# Patient Record
Sex: MALE | Race: WHITE | NOT HISPANIC OR LATINO | Employment: OTHER | URBAN - METROPOLITAN AREA
[De-identification: names, ages, dates, MRNs, and addresses within clinical notes are randomized per-mention and may not be internally consistent; named-entity substitution may affect disease eponyms.]

---

## 2023-10-04 ENCOUNTER — HOSPITAL ENCOUNTER (EMERGENCY)
Facility: HOSPITAL | Age: 73
Discharge: HOME | End: 2023-10-04
Attending: EMERGENCY MEDICINE
Payer: COMMERCIAL

## 2023-10-04 ENCOUNTER — APPOINTMENT (OUTPATIENT)
Dept: RADIOLOGY | Facility: HOSPITAL | Age: 73
End: 2023-10-04
Payer: COMMERCIAL

## 2023-10-04 VITALS
TEMPERATURE: 97.5 F | HEIGHT: 68 IN | SYSTOLIC BLOOD PRESSURE: 138 MMHG | WEIGHT: 164.9 LBS | DIASTOLIC BLOOD PRESSURE: 77 MMHG | BODY MASS INDEX: 24.99 KG/M2 | RESPIRATION RATE: 21 BRPM | HEART RATE: 68 BPM | OXYGEN SATURATION: 90 %

## 2023-10-04 DIAGNOSIS — R41.82 ALTERED MENTAL STATUS, UNSPECIFIED ALTERED MENTAL STATUS TYPE: Primary | ICD-10-CM

## 2023-10-04 LAB
ACANTHOCYTES BLD QL SMEAR: NORMAL
ALBUMIN SERPL BCP-MCNC: 3.9 G/DL (ref 3.4–5)
ALP SERPL-CCNC: 82 U/L (ref 33–136)
ALT SERPL W P-5'-P-CCNC: 6 U/L (ref 10–52)
AMMONIA PLAS-SCNC: 39 UMOL/L (ref 16–53)
ANION GAP SERPL CALC-SCNC: 8 MMOL/L (ref 10–20)
APPEARANCE UR: CLEAR
AST SERPL W P-5'-P-CCNC: 23 U/L (ref 9–39)
BASOPHILS # BLD AUTO: 0.01 X10*3/UL (ref 0–0.1)
BASOPHILS NFR BLD AUTO: 0.2 %
BILIRUB SERPL-MCNC: 0.4 MG/DL (ref 0–1.2)
BILIRUB UR STRIP.AUTO-MCNC: NEGATIVE MG/DL
BUN SERPL-MCNC: 10 MG/DL (ref 6–23)
BURR CELLS BLD QL SMEAR: NORMAL
CALCIUM SERPL-MCNC: 8.8 MG/DL (ref 8.6–10.3)
CHLORIDE SERPL-SCNC: 104 MMOL/L (ref 98–107)
CO2 SERPL-SCNC: 31 MMOL/L (ref 21–32)
COLOR UR: YELLOW
CREAT SERPL-MCNC: 0.82 MG/DL (ref 0.5–1.3)
EOSINOPHIL # BLD AUTO: 0.11 X10*3/UL (ref 0–0.4)
EOSINOPHIL NFR BLD AUTO: 2 %
ERYTHROCYTE [DISTWIDTH] IN BLOOD BY AUTOMATED COUNT: 21.1 % (ref 11.5–14.5)
GFR SERPL CREATININE-BSD FRML MDRD: >90 ML/MIN/1.73M*2
GLUCOSE SERPL-MCNC: 68 MG/DL (ref 74–99)
GLUCOSE UR STRIP.AUTO-MCNC: NEGATIVE MG/DL
HCT VFR BLD AUTO: 34 % (ref 41–52)
HGB BLD-MCNC: 10.3 G/DL (ref 13.5–17.5)
IMM GRANULOCYTES # BLD AUTO: 0.01 X10*3/UL (ref 0–0.5)
IMM GRANULOCYTES NFR BLD AUTO: 0.2 % (ref 0–0.9)
KETONES UR STRIP.AUTO-MCNC: NEGATIVE MG/DL
LEUKOCYTE ESTERASE UR QL STRIP.AUTO: NEGATIVE
LYMPHOCYTES # BLD AUTO: 0.91 X10*3/UL (ref 0.8–3)
LYMPHOCYTES NFR BLD AUTO: 16.6 %
MCH RBC QN AUTO: 24.3 PG (ref 26–34)
MCHC RBC AUTO-ENTMCNC: 30.3 G/DL (ref 32–36)
MCV RBC AUTO: 80 FL (ref 80–100)
MONOCYTES # BLD AUTO: 0.72 X10*3/UL (ref 0.05–0.8)
MONOCYTES NFR BLD AUTO: 13.1 %
NEUTROPHILS # BLD AUTO: 3.72 X10*3/UL (ref 1.6–5.5)
NEUTROPHILS NFR BLD AUTO: 67.9 %
NITRITE UR QL STRIP.AUTO: NEGATIVE
NRBC BLD-RTO: 0 /100 WBCS (ref 0–0)
PH UR STRIP.AUTO: 7 [PH]
PLATELET # BLD AUTO: 313 X10*3/UL (ref 150–450)
PMV BLD AUTO: 12 FL (ref 7.5–11.5)
POTASSIUM SERPL-SCNC: 3.5 MMOL/L (ref 3.5–5.3)
PROT SERPL-MCNC: 6.6 G/DL (ref 6.4–8.2)
PROT UR STRIP.AUTO-MCNC: NEGATIVE MG/DL
RBC # BLD AUTO: 4.24 X10*6/UL (ref 4.5–5.9)
RBC # UR STRIP.AUTO: NEGATIVE /UL
RBC MORPH BLD: NORMAL
SODIUM SERPL-SCNC: 139 MMOL/L (ref 136–145)
SP GR UR STRIP.AUTO: 1.02
TARGETS BLD QL SMEAR: NORMAL
UROBILINOGEN UR STRIP.AUTO-MCNC: NORMAL MG/DL
WBC # BLD AUTO: 5.5 X10*3/UL (ref 4.4–11.3)

## 2023-10-04 PROCEDURE — G1004 CDSM NDSC: HCPCS

## 2023-10-04 PROCEDURE — 82140 ASSAY OF AMMONIA: CPT

## 2023-10-04 PROCEDURE — 99285 EMERGENCY DEPT VISIT HI MDM: CPT | Mod: 25

## 2023-10-04 PROCEDURE — 80053 COMPREHEN METABOLIC PANEL: CPT

## 2023-10-04 PROCEDURE — 99284 EMERGENCY DEPT VISIT MOD MDM: CPT | Performed by: EMERGENCY MEDICINE

## 2023-10-04 PROCEDURE — 85025 COMPLETE CBC W/AUTO DIFF WBC: CPT

## 2023-10-04 PROCEDURE — 81003 URINALYSIS AUTO W/O SCOPE: CPT

## 2023-10-04 PROCEDURE — 36415 COLL VENOUS BLD VENIPUNCTURE: CPT

## 2023-10-04 PROCEDURE — 70450 CT HEAD/BRAIN W/O DYE: CPT | Performed by: RADIOLOGY

## 2023-10-04 PROCEDURE — 99285 EMERGENCY DEPT VISIT HI MDM: CPT | Performed by: EMERGENCY MEDICINE

## 2023-10-04 ASSESSMENT — PAIN SCALES - GENERAL
PAINLEVEL_OUTOF10: 4
PAINLEVEL_OUTOF10: 0 - NO PAIN

## 2023-10-04 ASSESSMENT — COLUMBIA-SUICIDE SEVERITY RATING SCALE - C-SSRS
2. HAVE YOU ACTUALLY HAD ANY THOUGHTS OF KILLING YOURSELF?: NO
6. HAVE YOU EVER DONE ANYTHING, STARTED TO DO ANYTHING, OR PREPARED TO DO ANYTHING TO END YOUR LIFE?: NO
1. IN THE PAST MONTH, HAVE YOU WISHED YOU WERE DEAD OR WISHED YOU COULD GO TO SLEEP AND NOT WAKE UP?: NO

## 2023-10-04 ASSESSMENT — LIFESTYLE VARIABLES
EVER FELT BAD OR GUILTY ABOUT YOUR DRINKING: NO
EVER HAD A DRINK FIRST THING IN THE MORNING TO STEADY YOUR NERVES TO GET RID OF A HANGOVER: NO
HAVE PEOPLE ANNOYED YOU BY CRITICIZING YOUR DRINKING: NO
HAVE YOU EVER FELT YOU SHOULD CUT DOWN ON YOUR DRINKING: NO

## 2023-10-04 ASSESSMENT — PAIN DESCRIPTION - LOCATION: LOCATION: SHOULDER

## 2023-10-04 ASSESSMENT — PAIN DESCRIPTION - ORIENTATION: ORIENTATION: LEFT

## 2023-10-04 ASSESSMENT — PAIN - FUNCTIONAL ASSESSMENT: PAIN_FUNCTIONAL_ASSESSMENT: 0-10

## 2023-10-04 NOTE — ED PROVIDER NOTES
HPI   No chief complaint on file.      73-year-old male brought in via EMS from his assisted living and memory care facility with a chief complaint of altered mental status.  Upon initial evaluation the patient is alert and oriented x3.  He is overall not complaining of any problems.  Per EMS report there was concern that he has not been taking his medications and has been having difficulty swallowing recently.  Patient states he does not know where they [place] from and does not report any difficulty with swallowing recently.  Otherwise he is complaining of left leg pain but denies any other symptoms.  Denies chest pain, shortness of breath, lightheadedness, dizziness, headache, abdominal pain, nausea, vomiting, diarrhea, constipation, dysuria, hematuria, hematochezia, melena.    A complete 10 point review of systems was completed and is otherwise negative except as listed in the HPI.      History provided by:  EMS personnel   used: No                          No data recorded                Patient History   Past Medical History:   Diagnosis Date    Personal history of other diseases of the circulatory system     History of hypertension    Personal history of transient ischemic attack (TIA), and cerebral infarction without residual deficits     History of stroke     Past Surgical History:   Procedure Laterality Date    OTHER SURGICAL HISTORY  05/26/2022    Knee replacement     No family history on file.  Social History     Tobacco Use    Smoking status: Not on file    Smokeless tobacco: Not on file   Substance Use Topics    Alcohol use: Not on file    Drug use: Not on file       Physical Exam   ED Triage Vitals   Temp Pulse Resp BP   -- -- -- --      SpO2 Temp src Heart Rate Source Patient Position   -- -- -- --      BP Location FiO2 (%)     -- --       Physical Exam  Vitals and nursing note reviewed.   Constitutional:       General: He is not in acute distress.     Appearance: Normal  appearance. He is not ill-appearing or toxic-appearing.   HENT:      Head: Normocephalic and atraumatic.      Right Ear: External ear normal.      Left Ear: External ear normal.      Nose: Nose normal.   Eyes:      General:         Right eye: No discharge.         Left eye: No discharge.      Extraocular Movements: Extraocular movements intact.      Conjunctiva/sclera: Conjunctivae normal.      Pupils: Pupils are equal, round, and reactive to light.   Cardiovascular:      Rate and Rhythm: Normal rate and regular rhythm.      Pulses: Normal pulses.      Heart sounds: Normal heart sounds. No murmur heard.  Pulmonary:      Effort: Pulmonary effort is normal.      Breath sounds: Normal breath sounds.   Abdominal:      General: There is no distension.      Palpations: Abdomen is soft. There is no mass.      Tenderness: There is no abdominal tenderness. There is no guarding.   Musculoskeletal:         General: No deformity or signs of injury. Normal range of motion.   Skin:     General: Skin is warm and dry.   Neurological:      General: No focal deficit present.      Mental Status: He is alert and oriented to person, place, and time. Mental status is at baseline.   Psychiatric:         Mood and Affect: Mood normal.         Behavior: Behavior normal.         ED Course & MDM   Diagnoses as of 10/04/23 1356   Altered mental status, unspecified altered mental status type       Medical Decision Making  73-year-old male brought in via EMS from his nursing home with concern for altered mental status.  Per EMS report patient also was reportedly having difficulty with swallowing over the past 3 weeks.  Patient denies any issues with swallowing.  Patient comfort CODE STATUS is DNR comfort care.  Patient was discussed with the nursing home, they are concerned that he is having dysphagia and they have attempted to obtain labs, were unable to obtain ammonia and a urinalysis.  We will obtain basic labs CBC, CMP, UA, ammonia as well as  a CT of the head.    Work-up is overall negative.  CT head without any acute injuries.  Lab work is unremarkable.  UA normal.  Ammonia normal.  CBC and CMP unremarkable.  No need for further evaluation or treatment in the hospital at this time.  Patient is DNR comfort care and there are no acute injuries or findings on his work-up that would require further treatment or evaluation.  Patient be discharged back home to his nursing home.  Patient will be provided transport.    Riky Barton DO, PGY-2  Emergency Medicine    Plan of care discussed with the attending physician.    Amount and/or Complexity of Data Reviewed  Labs: ordered.  Radiology: ordered.  ECG/medicine tests: ordered.    Risk  Decision regarding hospitalization.        Procedure  Procedures     Riky Barton DO  Resident  10/04/23 3557

## 2023-10-05 ENCOUNTER — HOSPITAL ENCOUNTER (OUTPATIENT)
Dept: CARDIOLOGY | Facility: HOSPITAL | Age: 73
Discharge: HOME | End: 2023-10-05
Payer: COMMERCIAL

## 2023-10-05 LAB
ATRIAL RATE: 64 BPM
P AXIS: 45 DEGREES
P OFFSET: 178 MS
P ONSET: 116 MS
PR INTERVAL: 212 MS
Q ONSET: 222 MS
QRS COUNT: 11 BEATS
QRS DURATION: 90 MS
QT INTERVAL: 412 MS
QTC CALCULATION(BAZETT): 425 MS
QTC FREDERICIA: 420 MS
R AXIS: 42 DEGREES
T AXIS: 51 DEGREES
T OFFSET: 428 MS
VENTRICULAR RATE: 64 BPM

## 2023-10-05 PROCEDURE — 93005 ELECTROCARDIOGRAM TRACING: CPT

## 2023-10-05 PROCEDURE — 93010 ELECTROCARDIOGRAM REPORT: CPT | Performed by: INTERNAL MEDICINE

## 2024-04-12 ENCOUNTER — LAB REQUISITION (OUTPATIENT)
Dept: LAB | Facility: HOSPITAL | Age: 74
End: 2024-04-12
Payer: COMMERCIAL

## 2024-04-12 DIAGNOSIS — D64.9 ANEMIA, UNSPECIFIED: ICD-10-CM

## 2024-04-12 LAB
ANION GAP SERPL CALC-SCNC: 12 MMOL/L (ref 10–20)
BUN SERPL-MCNC: 10 MG/DL (ref 6–23)
CALCIUM SERPL-MCNC: 8.2 MG/DL (ref 8.6–10.3)
CHLORIDE SERPL-SCNC: 102 MMOL/L (ref 98–107)
CO2 SERPL-SCNC: 27 MMOL/L (ref 21–32)
CREAT SERPL-MCNC: 0.68 MG/DL (ref 0.5–1.3)
EGFRCR SERPLBLD CKD-EPI 2021: >90 ML/MIN/1.73M*2
ERYTHROCYTE [DISTWIDTH] IN BLOOD BY AUTOMATED COUNT: 21.3 % (ref 11.5–14.5)
GLUCOSE SERPL-MCNC: 96 MG/DL (ref 74–99)
HCT VFR BLD AUTO: 38.8 % (ref 41–52)
HGB BLD-MCNC: 11.6 G/DL (ref 13.5–17.5)
MCH RBC QN AUTO: 26.2 PG (ref 26–34)
MCHC RBC AUTO-ENTMCNC: 29.9 G/DL (ref 32–36)
MCV RBC AUTO: 88 FL (ref 80–100)
NRBC BLD-RTO: 0 /100 WBCS (ref 0–0)
PLATELET # BLD AUTO: 363 X10*3/UL (ref 150–450)
POTASSIUM SERPL-SCNC: 4 MMOL/L (ref 3.5–5.3)
RBC # BLD AUTO: 4.43 X10*6/UL (ref 4.5–5.9)
SODIUM SERPL-SCNC: 137 MMOL/L (ref 136–145)
WBC # BLD AUTO: 5.2 X10*3/UL (ref 4.4–11.3)

## 2024-04-12 PROCEDURE — 85027 COMPLETE CBC AUTOMATED: CPT

## 2024-04-12 PROCEDURE — 80048 BASIC METABOLIC PNL TOTAL CA: CPT

## 2024-05-28 ENCOUNTER — OFFICE VISIT (OUTPATIENT)
Dept: ORTHOPEDIC SURGERY | Facility: CLINIC | Age: 74
End: 2024-05-28
Payer: COMMERCIAL

## 2024-05-28 ENCOUNTER — HOSPITAL ENCOUNTER (OUTPATIENT)
Dept: RADIOLOGY | Facility: CLINIC | Age: 74
Discharge: HOME | End: 2024-05-28
Payer: COMMERCIAL

## 2024-05-28 DIAGNOSIS — M79.642 PAIN OF LEFT HAND: ICD-10-CM

## 2024-05-28 DIAGNOSIS — S62.337A DISPLACED FRACTURE OF NECK OF FIFTH METACARPAL BONE, LEFT HAND, INITIAL ENCOUNTER FOR CLOSED FRACTURE: ICD-10-CM

## 2024-05-28 PROCEDURE — 73130 X-RAY EXAM OF HAND: CPT | Mod: LT

## 2024-05-28 PROCEDURE — 26600 TREAT METACARPAL FRACTURE: CPT | Performed by: FAMILY MEDICINE

## 2024-05-28 PROCEDURE — 73130 X-RAY EXAM OF HAND: CPT | Mod: LEFT SIDE | Performed by: FAMILY MEDICINE

## 2024-05-28 PROCEDURE — 29075 APPL CST ELBW FNGR SHORT ARM: CPT | Performed by: FAMILY MEDICINE

## 2024-05-28 PROCEDURE — 99204 OFFICE O/P NEW MOD 45 MIN: CPT | Performed by: FAMILY MEDICINE

## 2024-05-28 PROCEDURE — 99214 OFFICE O/P EST MOD 30 MIN: CPT | Mod: 57 | Performed by: FAMILY MEDICINE

## 2024-05-28 PROCEDURE — 26605 TREAT METACARPAL FRACTURE: CPT | Performed by: FAMILY MEDICINE

## 2024-05-28 RX ORDER — HYDROCODONE BITARTRATE AND ACETAMINOPHEN 5; 325 MG/1; MG/1
1 TABLET ORAL EVERY 12 HOURS PRN
Qty: 14 TABLET | Refills: 0 | Status: SHIPPED | OUTPATIENT
Start: 2024-05-28 | End: 2024-06-04

## 2024-05-28 NOTE — PROGRESS NOTES
Acute Injury New Patient Visit    CC:   Chief Complaint   Patient presents with    Left Hand - Pain       HPI: Nguyễn is a 73 y.o.male who presents today with new complaints of left hand pain and discomfort.  Patient currently resides in a skilled nursing facility with multiple chronic comorbidities states that he had fallen and injured his left hand the other day.  X-rays were obtained at that facility that demonstrated a small fracture to the fifth metacarpal.  He presents here today for further evaluation.  He denies any numbness tingling or burning.        Review of Systems   GENERAL: Negative for malaise, significant weight loss, fever  MUSCULOSKELETAL: See HPI  NEURO: Negative for numbness / tingling     Past Medical History  Past Medical History:   Diagnosis Date    Personal history of other diseases of the circulatory system     History of hypertension    Personal history of transient ischemic attack (TIA), and cerebral infarction without residual deficits     History of stroke       Medication review  Medication Documentation Review Audit       Reviewed by Riky Batron DO (Resident) on 10/04/23 at 1336      Medication Order Taking? Sig Documenting Provider Last Dose Status            No Medications to Display                                   Allergies  No Known Allergies    Social History  Social History     Socioeconomic History    Marital status: Single     Spouse name: Not on file    Number of children: Not on file    Years of education: Not on file    Highest education level: Not on file   Occupational History    Not on file   Tobacco Use    Smoking status: Never    Smokeless tobacco: Never   Vaping Use    Vaping status: Never Used   Substance and Sexual Activity    Alcohol use: Never    Drug use: Not on file    Sexual activity: Not on file   Other Topics Concern    Not on file   Social History Narrative    Not on file     Social Determinants of Health     Financial Resource Strain: Not on file    Food Insecurity: Not on file   Transportation Needs: Not on file   Physical Activity: Not on file   Stress: Not on file   Social Connections: Not on file   Intimate Partner Violence: Not on file   Housing Stability: Not on file       Surgical History  Past Surgical History:   Procedure Laterality Date    OTHER SURGICAL HISTORY  05/26/2022    Knee replacement       Physical Exam:  GENERAL:  Patient is awake, alert, and oriented to person place and time.  Patient appears well nourished and well kept.  Affect Calm, Not Acutely Distressed.  HEENT:  Normocephalic, Atraumatic, EOMI  CARDIOVASCULAR:  Hemodynamically stable.  RESPIRATORY:  Normal respirations with unlabored breathing.  NEURO: Gross sensation intact to the upper extremities bilaterally.  Extremity: Left hand exam demonstrates significant soft tissue swelling and bruising he has tenderness palpation with mild crepitus at the fifth metacarpal head neck region.  He has a full ability to extend with no presence of any extensor lag.  There is no obvious rotational deficiency or deformity.  Good distal cap refill noted throughout.  The remainder of the left upper extremity demonstrates good range of motion and strength.  There is no obvious visible open cuts wounds or sores.      Diagnostics: X-rays today demonstrate slightly angulated apex dorsal and minimally comminuted fifth metacarpal head neck fracture        Procedure: Short arm cast application with molding buttress  Procedures    Assessment:   Problem List Items Addressed This Visit    None  Visit Diagnoses       Pain of left hand        Relevant Orders    XR hand left 3+ views    Displaced fracture of neck of fifth metacarpal bone, left hand, initial encounter for closed fracture        Relevant Medications    HYDROcodone-acetaminophen (Norco) 5-325 mg tablet             Plan: We discussed the nonoperative nature of this injury with the patient at the bedside.  He is agreeable and willing to proceed  forward.  He was provided with a short arm cast here today with a slight extension mold and buttress to the fifth metacarpal neck fracture.  We will see him back in 3 to 4 weeks for repeat evaluation we will repeat x-rays out of the cast 3 views of the left hand we will likely transition to a removable fracture brace at that time.  Will provide a printed off prescription for pain medication that he may take to his facility.  He will continue to comply with the vitamin D that is already supplemented 5000 units a day.  He will maintain nonweightbearing to left upper extremity to the best that he can until seen in follow-up.  Orders Placed This Encounter    XR hand left 3+ views    HYDROcodone-acetaminophen (Norco) 5-325 mg tablet      At the conclusion of the visit there were no further questions by the patient/family regarding their plan of care.  Patient was instructed to call or return with any issues, questions, or concerns regarding their injury and/or treatment plan described above.     05/28/24 at 3:15 PM - Cole C Budinsky, MD    Office: [phone]    This note was prepared using voice recognition software.  The details of this note are correct and have been reviewed, and corrected to the best of my ability.  Some grammatical errors may persist related to the Dragon software.

## 2024-06-26 ENCOUNTER — APPOINTMENT (OUTPATIENT)
Dept: ORTHOPEDIC SURGERY | Facility: CLINIC | Age: 74
End: 2024-06-26
Payer: COMMERCIAL

## 2024-06-28 ENCOUNTER — HOSPITAL ENCOUNTER (OUTPATIENT)
Dept: RADIOLOGY | Facility: CLINIC | Age: 74
Discharge: HOME | End: 2024-06-28
Payer: COMMERCIAL

## 2024-06-28 ENCOUNTER — OFFICE VISIT (OUTPATIENT)
Dept: ORTHOPEDIC SURGERY | Facility: CLINIC | Age: 74
End: 2024-06-28
Payer: COMMERCIAL

## 2024-06-28 DIAGNOSIS — S62.337A DISPLACED FRACTURE OF NECK OF FIFTH METACARPAL BONE, LEFT HAND, INITIAL ENCOUNTER FOR CLOSED FRACTURE: ICD-10-CM

## 2024-06-28 PROCEDURE — 1160F RVW MEDS BY RX/DR IN RCRD: CPT | Performed by: FAMILY MEDICINE

## 2024-06-28 PROCEDURE — 99024 POSTOP FOLLOW-UP VISIT: CPT | Performed by: FAMILY MEDICINE

## 2024-06-28 PROCEDURE — 1036F TOBACCO NON-USER: CPT | Performed by: FAMILY MEDICINE

## 2024-06-28 PROCEDURE — 1159F MED LIST DOCD IN RCRD: CPT | Performed by: FAMILY MEDICINE

## 2024-06-28 PROCEDURE — L3809 WHFO W/O JOINTS PRE OTS: HCPCS | Performed by: FAMILY MEDICINE

## 2024-06-28 PROCEDURE — 73130 X-RAY EXAM OF HAND: CPT | Mod: LT

## 2024-06-28 NOTE — PROGRESS NOTES
Established Patient Follow-Up Visit    CC:   Chief Complaint   Patient presents with    Left Hand - Follow-up, Pain     Displaced fracture of neck of fifth metacarpal bone, left hand, initial encounter for closed fracture      Xrays today   XOP       HPI:  Nguyễn is a 73 y.o. male returns here today for follow-up visit regarding: Follow-up of his left fifth metacarpal neck fracture.  He tolerated the cast well.  He presents here today for repeat evaluation denies any worsening issues or concerns          REVIEW OF SYSTEMS:  GENERAL: Negative for malaise, significant weight loss, fever  MUSCULOSKELETAL: See HPI  NEURO: Negative for numbness / tingling       PHYSICAL EXAM:  -Neuro: Gross sensation intact to the upper extremities bilaterally.  -Extremity: Left hand demonstrates soft tissue swelling he has got some mild tenderness over the fifth metacarpal neck with no crepitus.  He has ability to fully extend the fifth MCP and flex he can generate a full grasp and .  No obvious angulation or rotational deformity seen throughout the digits.  Pulses and sensation are intact no open cuts wounds or sores.    IMAGING: Repeat x-rays today demonstrate stable appearing minimally angulated left fifth metacarpal neck fracture.  XR hand left 3+ views  Narrative: Interpreted By:  Budinsky, Cole,   STUDY:  XR HAND LEFT 3+ VIEWS; ;  5/28/2024 2:54 pm      INDICATION:  Signs/Symptoms:pain.      ACCESSION NUMBER(S):  [id number]      ORDERING CLINICIAN:  COLE BUDINSKY      Impression: Three views left hand demonstrate an acute minimally comminuted and  minimally angulated apex dorsal left 5th metacarpal neck fracture.  Shortening of the fracture is present. 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          Signed by: Cole Budinsky 5/29/2024 5:55 PM  Dictation workstation:    [id number]      PROCEDURE: None  Procedures     ASSESSMENT:   Follow-up visit for:  Problem List Items Addressed This Visit    None  Visit Diagnoses       Displaced fracture of neck of fifth metacarpal bone, left hand, initial encounter for closed fracture        Relevant Orders    XR hand left 3+ views    Boxer Fracture Brace             PLAN: At this time we will transition patient into a ulnar gutter wrist free removable fracture brace.  He can come out of this for early range of motion and strength recovery as tolerated.  Recommend he continue with the brace for protection and immobilization when up and ambulatory.  Will see him back in 3 weeks for repeat evaluation repeat final set x-rays 3 views of the left hand at that time  Orders Placed This Encounter    Boxer Fracture Brace    XR hand left 3+ views           At the conclusion of the visit there were no further questions by the patient/family regarding their plan of care.  Patient was instructed to call or return with any issues, questions, or concerns regarding their injury and/or treatment plan described above.     06/28/24 at 5:10 PM - Cole C Budinsky, MD    Office: [phone]    This note was prepared using voice recognition software.  The details of this note are correct and have been reviewed, and corrected to the best of my ability.  Some grammatical errors may persist related to the Dragon software.

## 2024-07-24 ENCOUNTER — HOSPITAL ENCOUNTER (OUTPATIENT)
Dept: RADIOLOGY | Facility: CLINIC | Age: 74
Discharge: HOME | End: 2024-07-24
Payer: COMMERCIAL

## 2024-07-24 ENCOUNTER — OFFICE VISIT (OUTPATIENT)
Dept: ORTHOPEDIC SURGERY | Facility: CLINIC | Age: 74
End: 2024-07-24
Payer: COMMERCIAL

## 2024-07-24 DIAGNOSIS — S62.337A DISPLACED FRACTURE OF NECK OF FIFTH METACARPAL BONE, LEFT HAND, INITIAL ENCOUNTER FOR CLOSED FRACTURE: ICD-10-CM

## 2024-07-24 PROCEDURE — 99211 OFF/OP EST MAY X REQ PHY/QHP: CPT | Performed by: FAMILY MEDICINE

## 2024-07-24 PROCEDURE — 73130 X-RAY EXAM OF HAND: CPT | Mod: LT

## 2024-07-24 PROCEDURE — 1036F TOBACCO NON-USER: CPT | Performed by: FAMILY MEDICINE

## 2024-07-24 PROCEDURE — 99024 POSTOP FOLLOW-UP VISIT: CPT | Performed by: FAMILY MEDICINE

## 2024-07-24 PROCEDURE — 1160F RVW MEDS BY RX/DR IN RCRD: CPT | Performed by: FAMILY MEDICINE

## 2024-07-24 PROCEDURE — 1159F MED LIST DOCD IN RCRD: CPT | Performed by: FAMILY MEDICINE

## 2024-07-24 PROCEDURE — 73130 X-RAY EXAM OF HAND: CPT | Mod: LEFT SIDE | Performed by: FAMILY MEDICINE

## 2024-07-24 NOTE — PROGRESS NOTES
Established Patient Follow-Up Visit    CC:   Chief Complaint   Patient presents with    Left Hand - Follow-up, Pain     Displaced fracture of neck of fifth metacarpal bone, left hand, initial encounter for closed fracture      Xrays today        HPI:  Nguyễn is a 73 y.o. male returns here today for follow-up visit regarding: Follow-up of his left fifth metacarpal neck fracture.  He states has been compliant with the brace.  Denies any additional issues needs or concerns at present.  Has a little bit of discomfort directly pressing on the fracture but otherwise has significant improved range of motion and strength.          REVIEW OF SYSTEMS:  GENERAL: Negative for malaise, significant weight loss, fever  MUSCULOSKELETAL: See HPI  NEURO: Negative for numbness / tingling       PHYSICAL EXAM:  -Neuro: Gross sensation intact to the upper extremities bilaterally.  -Extremity: Left hand demonstrates minimal soft tissue swelling there is a prominence of the fifth metacarpal head neck region he has no extensor lag.  He can fully extend and flex the digit has full MCP DIP and PIP motion.  Distal cap refill is intact.  No pain elsewhere.    IMAGING: Repeat x-rays today demonstrate stable appearing minimally displaced comminuted and shortened left fifth metacarpal head neck fracture.  There is stable appearing alignment and subtle increase sclerotic calcification indicating continued healing.      PROCEDURE: None  Procedures     ASSESSMENT:   Follow-up visit for:  Problem List Items Addressed This Visit    None  Visit Diagnoses       Displaced fracture of neck of fifth metacarpal bone, left hand, initial encounter for closed fracture        Relevant Orders    XR hand left 3+ views             PLAN: At this time discussed with patient that the fracture is trending towards healing and looking very well on x-rays today.  We will have the patient follow-up in 6 weeks for repeat evaluation.  Repeat 1 final set x-rays 3 views of  the left hand.  He will continue with his vitamin D supplementation.  He can start to wean from his removable fracture brace as tolerated.  Would recommend when up and ambulating that he has the brace on for protection otherwise he may take this off when seated or in a nonrisky situation.  He does not need to sleep at this when seated eating meals or relaxing he does not need to have the brace on as well.  He can start to work increased range of motion and strength with physical and Occupational Therapy as prescribed at his facility.  They should call or return with any worsening issues otherwise we will anticipate 1 final set of x-rays and repeat evaluation in 6 weeks.  Orders Placed This Encounter    XR hand left 3+ views           At the conclusion of the visit there were no further questions by the patient/family regarding their plan of care.  Patient was instructed to call or return with any issues, questions, or concerns regarding their injury and/or treatment plan described above.     07/24/24 at 3:05 PM - Cole C Budinsky, MD    Office: [phone]    This note was prepared using voice recognition software.  The details of this note are correct and have been reviewed, and corrected to the best of my ability.  Some grammatical errors may persist related to the Dragon software.

## 2024-09-02 ENCOUNTER — HOSPITAL ENCOUNTER (EMERGENCY)
Facility: HOSPITAL | Age: 74
Discharge: PSYCHIATRIC OR SUBSTANCE USE FACILITY | End: 2024-09-03
Attending: STUDENT IN AN ORGANIZED HEALTH CARE EDUCATION/TRAINING PROGRAM
Payer: COMMERCIAL

## 2024-09-02 ENCOUNTER — APPOINTMENT (OUTPATIENT)
Dept: CARDIOLOGY | Facility: HOSPITAL | Age: 74
End: 2024-09-02
Payer: COMMERCIAL

## 2024-09-02 DIAGNOSIS — R62.7 FAILURE TO THRIVE IN ADULT: Primary | ICD-10-CM

## 2024-09-02 DIAGNOSIS — R45.851 SUICIDAL IDEATION: ICD-10-CM

## 2024-09-02 LAB
ALBUMIN SERPL BCP-MCNC: 4 G/DL (ref 3.4–5)
ALP SERPL-CCNC: 119 U/L (ref 33–136)
ALT SERPL W P-5'-P-CCNC: 18 U/L (ref 10–52)
AMPHETAMINES UR QL SCN: NORMAL
ANION GAP SERPL CALC-SCNC: 12 MMOL/L (ref 10–20)
APAP SERPL-MCNC: <10 UG/ML
APPEARANCE UR: CLEAR
AST SERPL W P-5'-P-CCNC: 25 U/L (ref 9–39)
BARBITURATES UR QL SCN: NORMAL
BASOPHILS # BLD AUTO: 0.01 X10*3/UL (ref 0–0.1)
BASOPHILS NFR BLD AUTO: 0.2 %
BENZODIAZ UR QL SCN: NORMAL
BILIRUB SERPL-MCNC: 0.3 MG/DL (ref 0–1.2)
BILIRUB UR STRIP.AUTO-MCNC: NEGATIVE MG/DL
BUN SERPL-MCNC: 9 MG/DL (ref 6–23)
BZE UR QL SCN: NORMAL
CALCIUM SERPL-MCNC: 8.8 MG/DL (ref 8.6–10.3)
CANNABINOIDS UR QL SCN: NORMAL
CHLORIDE SERPL-SCNC: 101 MMOL/L (ref 98–107)
CO2 SERPL-SCNC: 27 MMOL/L (ref 21–32)
COLOR UR: COLORLESS
CREAT SERPL-MCNC: 0.62 MG/DL (ref 0.5–1.3)
EGFRCR SERPLBLD CKD-EPI 2021: >90 ML/MIN/1.73M*2
EOSINOPHIL # BLD AUTO: 0.09 X10*3/UL (ref 0–0.4)
EOSINOPHIL NFR BLD AUTO: 1.4 %
ERYTHROCYTE [DISTWIDTH] IN BLOOD BY AUTOMATED COUNT: 18.7 % (ref 11.5–14.5)
ETHANOL SERPL-MCNC: <10 MG/DL
FENTANYL+NORFENTANYL UR QL SCN: NORMAL
GLUCOSE SERPL-MCNC: 105 MG/DL (ref 74–99)
GLUCOSE UR STRIP.AUTO-MCNC: NORMAL MG/DL
HCT VFR BLD AUTO: 41.3 % (ref 41–52)
HGB BLD-MCNC: 13.1 G/DL (ref 13.5–17.5)
IMM GRANULOCYTES # BLD AUTO: 0.02 X10*3/UL (ref 0–0.5)
IMM GRANULOCYTES NFR BLD AUTO: 0.3 % (ref 0–0.9)
KETONES UR STRIP.AUTO-MCNC: NEGATIVE MG/DL
LEUKOCYTE ESTERASE UR QL STRIP.AUTO: NEGATIVE
LYMPHOCYTES # BLD AUTO: 1.39 X10*3/UL (ref 0.8–3)
LYMPHOCYTES NFR BLD AUTO: 21 %
MCH RBC QN AUTO: 29.2 PG (ref 26–34)
MCHC RBC AUTO-ENTMCNC: 31.7 G/DL (ref 32–36)
MCV RBC AUTO: 92 FL (ref 80–100)
METHADONE UR QL SCN: NORMAL
MONOCYTES # BLD AUTO: 0.81 X10*3/UL (ref 0.05–0.8)
MONOCYTES NFR BLD AUTO: 12.2 %
NEUTROPHILS # BLD AUTO: 4.3 X10*3/UL (ref 1.6–5.5)
NEUTROPHILS NFR BLD AUTO: 64.9 %
NITRITE UR QL STRIP.AUTO: NEGATIVE
NRBC BLD-RTO: 0 /100 WBCS (ref 0–0)
OPIATES UR QL SCN: NORMAL
OXYCODONE+OXYMORPHONE UR QL SCN: NORMAL
PCP UR QL SCN: NORMAL
PH UR STRIP.AUTO: 6.5 [PH]
PLATELET # BLD AUTO: 335 X10*3/UL (ref 150–450)
POTASSIUM SERPL-SCNC: 4 MMOL/L (ref 3.5–5.3)
PROT SERPL-MCNC: 7.2 G/DL (ref 6.4–8.2)
PROT UR STRIP.AUTO-MCNC: NEGATIVE MG/DL
RBC # BLD AUTO: 4.48 X10*6/UL (ref 4.5–5.9)
RBC # UR STRIP.AUTO: NEGATIVE /UL
SALICYLATES SERPL-MCNC: <3 MG/DL
SODIUM SERPL-SCNC: 136 MMOL/L (ref 136–145)
SP GR UR STRIP.AUTO: 1.01
UROBILINOGEN UR STRIP.AUTO-MCNC: NORMAL MG/DL
WBC # BLD AUTO: 6.6 X10*3/UL (ref 4.4–11.3)

## 2024-09-02 PROCEDURE — 2500000001 HC RX 250 WO HCPCS SELF ADMINISTERED DRUGS (ALT 637 FOR MEDICARE OP): Performed by: STUDENT IN AN ORGANIZED HEALTH CARE EDUCATION/TRAINING PROGRAM

## 2024-09-02 PROCEDURE — 81003 URINALYSIS AUTO W/O SCOPE: CPT | Performed by: STUDENT IN AN ORGANIZED HEALTH CARE EDUCATION/TRAINING PROGRAM

## 2024-09-02 PROCEDURE — 93010 ELECTROCARDIOGRAM REPORT: CPT | Performed by: STUDENT IN AN ORGANIZED HEALTH CARE EDUCATION/TRAINING PROGRAM

## 2024-09-02 PROCEDURE — 2500000001 HC RX 250 WO HCPCS SELF ADMINISTERED DRUGS (ALT 637 FOR MEDICARE OP)

## 2024-09-02 PROCEDURE — 80143 DRUG ASSAY ACETAMINOPHEN: CPT | Performed by: STUDENT IN AN ORGANIZED HEALTH CARE EDUCATION/TRAINING PROGRAM

## 2024-09-02 PROCEDURE — 93005 ELECTROCARDIOGRAM TRACING: CPT

## 2024-09-02 PROCEDURE — 99285 EMERGENCY DEPT VISIT HI MDM: CPT | Performed by: STUDENT IN AN ORGANIZED HEALTH CARE EDUCATION/TRAINING PROGRAM

## 2024-09-02 PROCEDURE — 99285 EMERGENCY DEPT VISIT HI MDM: CPT

## 2024-09-02 PROCEDURE — 80307 DRUG TEST PRSMV CHEM ANLYZR: CPT | Performed by: STUDENT IN AN ORGANIZED HEALTH CARE EDUCATION/TRAINING PROGRAM

## 2024-09-02 PROCEDURE — 80053 COMPREHEN METABOLIC PANEL: CPT | Performed by: STUDENT IN AN ORGANIZED HEALTH CARE EDUCATION/TRAINING PROGRAM

## 2024-09-02 PROCEDURE — 36415 COLL VENOUS BLD VENIPUNCTURE: CPT | Performed by: STUDENT IN AN ORGANIZED HEALTH CARE EDUCATION/TRAINING PROGRAM

## 2024-09-02 PROCEDURE — 85025 COMPLETE CBC W/AUTO DIFF WBC: CPT | Performed by: STUDENT IN AN ORGANIZED HEALTH CARE EDUCATION/TRAINING PROGRAM

## 2024-09-02 PROCEDURE — 80320 DRUG SCREEN QUANTALCOHOLS: CPT | Performed by: STUDENT IN AN ORGANIZED HEALTH CARE EDUCATION/TRAINING PROGRAM

## 2024-09-02 RX ORDER — TALC
3 POWDER (GRAM) TOPICAL ONCE
Status: COMPLETED | OUTPATIENT
Start: 2024-09-02 | End: 2024-09-02

## 2024-09-02 RX ORDER — IBUPROFEN 400 MG/1
400 TABLET ORAL ONCE
Status: COMPLETED | OUTPATIENT
Start: 2024-09-02 | End: 2024-09-02

## 2024-09-02 SDOH — HEALTH STABILITY: MENTAL HEALTH: BEHAVIORS/MOOD: ANXIOUS

## 2024-09-02 SDOH — HEALTH STABILITY: MENTAL HEALTH: CONTENT: BLAMING SELF

## 2024-09-02 SDOH — SOCIAL STABILITY: SOCIAL NETWORK: EMOTIONAL SUPPORT GIVEN: REASSURE

## 2024-09-02 SDOH — HEALTH STABILITY: MENTAL HEALTH: NEEDS EXPRESSED: DENIES

## 2024-09-02 SDOH — HEALTH STABILITY: MENTAL HEALTH

## 2024-09-02 SDOH — HEALTH STABILITY: MENTAL HEALTH: SLEEP PATTERN: RESTLESSNESS;DIFFICULTY FALLING ASLEEP;DISTURBED/INTERRUPTED SLEEP

## 2024-09-02 ASSESSMENT — PAIN SCALES - GENERAL
PAINLEVEL_OUTOF10: 4
PAINLEVEL_OUTOF10: 5 - MODERATE PAIN
PAINLEVEL_OUTOF10: 4
PAINLEVEL_OUTOF10: 8

## 2024-09-02 ASSESSMENT — COLUMBIA-SUICIDE SEVERITY RATING SCALE - C-SSRS
6. HAVE YOU EVER DONE ANYTHING, STARTED TO DO ANYTHING, OR PREPARED TO DO ANYTHING TO END YOUR LIFE?: NO
2. HAVE YOU ACTUALLY HAD ANY THOUGHTS OF KILLING YOURSELF?: NO
1. SINCE LAST CONTACT, HAVE YOU WISHED YOU WERE DEAD OR WISHED YOU COULD GO TO SLEEP AND NOT WAKE UP?: YES

## 2024-09-02 ASSESSMENT — PAIN DESCRIPTION - PAIN TYPE: TYPE: CHRONIC PAIN

## 2024-09-02 ASSESSMENT — PAIN DESCRIPTION - LOCATION
LOCATION: GENERALIZED
LOCATION: KNEE

## 2024-09-02 ASSESSMENT — PAIN - FUNCTIONAL ASSESSMENT
PAIN_FUNCTIONAL_ASSESSMENT: 0-10
PAIN_FUNCTIONAL_ASSESSMENT: 0-10

## 2024-09-02 ASSESSMENT — PAIN DESCRIPTION - PROGRESSION: CLINICAL_PROGRESSION: NOT CHANGED

## 2024-09-02 ASSESSMENT — PAIN DESCRIPTION - ORIENTATION: ORIENTATION: LEFT

## 2024-09-02 NOTE — CONSULTS
"Visit type: Virtual evaluation; consent for telepsychiatric evaluation was obtained from patient.    HISTORY OF PRESENT ILLNESS:  Nguyễn Hernandez is a 74 y.o. male with a past medical history of bipolar I disorder, polysubstance abuse (including alcohol use disorder in remission >30 years), Parkinson's disease, hypertension, hyperlipidemia, hypothyroidism, hx of limbic encephalitis, prior right MCA stroke (9/2018) s/p thrombectomy, and low back pain s/p lumbar fusion who presented to [facility] ED on 9/2/24 for suicidal ideation. EPAT was consulted for further evaluation.     On interview:  The patient reported his mood as \"bad thoughts\" and endorsed worsening depressed mood over the last several months. The patient endorses suicidal ideation with plan to overdose and notes that suicidality comes and goes at times but more recently he has been preoccupied with death. He reported suicide attempt 6 months ago via medication overdose but that nothing occurred and that he never reported this. The patient endorses poor sleep 2/2 neighboring patient that often screams at night which makes it difficult for him to fall asleep. He has requested to have his room moved but has been unable to make this occur yet.     Regarding psychotropic medications, the patient is unable to recall or verify his medications when cross referenced with dispense report. However, he notes no recent changes in his medications. The patient is unsure regarding his bipolar I disorder diagnosis and denies any aubrey psychiatric hospitalizations.     The patient does endorse history of substance use and most notably difficulty with alcohol dependence in the past. The patient has been in remission for over 30 years.     The patient does not endorse homicidal ideation nor any auditory or visual hallucinations. The patient did not endorse any delusions.     Current psychotropic medications (per dispense report, unable to verify nursing facility med " "list):  Citalopram 10 mg   Depakote  mg at bedtime  Duloxetine 30 mg BID  Trazodone 50 mg   Carbidopa/levodopa  mg (unsure on scheduling)  Memantine 5 or 10 mg (unable to verify) - per last neurology visit it was recommended that this be discontinued as it cn worsen hallucinations, delusions, and agitation    PSYCHIATRIC REVIEW OF SYSTEMS  Depression: depressed mood, difficulty concentrating, thinking or making decisions, sleep disturbance: difficulty falling asleep, early morning awakening, and difficulty related to screaming neighbor at facility, feelings of worthlessness or guilt, feelings of hopelessness, markedly diminished interest or pleasure in all or most activities, and recurrent thoughts of death or suicidal ideation  Anxiety: negative  Jennifer: negative  Psychosis: negative  Delirium: negative   Trauma: negative    PSYCHIATRIC HISTORY  Prior diagnoses: as above   Prior hospitalizations: denies   History of self-harm/suicide attempts: attempted to overdose on medication but it didn't work (this occurred about 6 months ago)  History of trauma/abuse/loss: endorses vague history of trauma throughout his lifetime (\"has dealt with a lot of stuff through his life\")  History of violence: denies    Past psychiatric medications: benztropine, olanzapine, haloperidol, lithium (stopped in 2020), abilify     SUBSTANCE USE HISTORY   He reports that he has never smoked. He has never used smokeless tobacco. He reports that he does not drink alcohol. No history on file for drug use.    SOCIAL HISTORY  Social History     Socioeconomic History    Marital status: Single   Tobacco Use    Smoking status: Never    Smokeless tobacco: Never   Vaping Use    Vaping status: Never Used   Substance and Sexual Activity    Alcohol use: Never      Current living situation: lives at nursing facility   Current employment/source of income: retired, previously worked as a semi- for 30 years     Family: has 1 older " "sister whom he stated he is close with   Marital status/Children: single, no children    Access to weapons: denies    PAST MEDICAL HISTORY  Past Medical History:   Diagnosis Date    Parkinson's disease (tremor, stiffness, slow motion, unstable posture) (Multi)     Personal history of other diseases of the circulatory system     History of hypertension    Personal history of transient ischemic attack (TIA), and cerebral infarction without residual deficits     History of stroke        PAST SURGICAL HISTORY  Past Surgical History:   Procedure Laterality Date    OTHER SURGICAL HISTORY  05/26/2022    Knee replacement        FAMILY HISTORY  No family history on file.     ALLERGIES  Patient has no known allergies.    OARRS REVIEW  OARRS checked: yes  OARRS comments: score of 50; hx of previous fills of cannabis (last filled on 10/21/22)    OBJECTIVE    VITALS      10/4/2023    12:30 PM 10/4/2023     1:00 PM 10/4/2023     1:30 PM 10/4/2023     2:00 PM 10/4/2023     2:30 PM 10/4/2023     3:00 PM 9/2/2024     5:18 PM   Vitals   Systolic 141 143 149 135 108 138 156   Diastolic 83 76 75 73 78 77 75   Heart Rate 82 64 68 70 66 68 79   Temp       36.8 °C (98.2 °F)   Resp 32 13 21 19 16 21 18   Height (in)       1.575 m (5' 2\")   Weight (lb)       161   BMI       29.45 kg/m2   BSA (m2)       1.79 m2        MENTAL STATUS EXAM  Appearance: elderly  male, dressed in hospital attire, sitting upright in hospital bed  Attitude: calm, cooperative, and engaged in conversation  Behavior: good eye contact  Motor Activity: +psychomotor agitation with bilateral upper extremity tremors R>L, lip smacking noted,   Speech: regular rate, volume; mild hypophonia, no dysarthria  Mood: \"not great\"  Affect: dysphoric, full range, non-labile  Thought Process: linear and logical; no loose associations or gross thought disorganization noted  Thought Content:  endorses suicidal ideation with plan to overdose on medications; no delusions " elicited  Thought Perception: does not endorse  or visual hallucinations; does not appear to be responding to any hallucinatory stimuli  Cognition: alert and oriented to person, place, time and circumstance.   Insight: limited  Judgement: poor    HOME MEDICATIONS  Medication Documentation Review Audit       Reviewed by Stacie Harmon RN (Registered Nurse) on 09/02/24 at 1740      Medication Order Taking? Sig Documenting Provider Last Dose Status            No Medications to Display                                    CURRENT MEDICATIONS  Scheduled medications      Continuous medications      PRN medications       LABS  Results for orders placed or performed during the hospital encounter of 09/02/24 (from the past 24 hour(s))   CBC with Differential   Result Value Ref Range    WBC 6.6 4.4 - 11.3 x10*3/uL    nRBC 0.0 0.0 - 0.0 /100 WBCs    RBC 4.48 (L) 4.50 - 5.90 x10*6/uL    Hemoglobin 13.1 (L) 13.5 - 17.5 g/dL    Hematocrit 41.3 41.0 - 52.0 %    MCV 92 80 - 100 fL    MCH 29.2 26.0 - 34.0 pg    MCHC 31.7 (L) 32.0 - 36.0 g/dL    RDW 18.7 (H) 11.5 - 14.5 %    Platelets 335 150 - 450 x10*3/uL    Neutrophils % 64.9 40.0 - 80.0 %    Immature Granulocytes %, Automated 0.3 0.0 - 0.9 %    Lymphocytes % 21.0 13.0 - 44.0 %    Monocytes % 12.2 2.0 - 10.0 %    Eosinophils % 1.4 0.0 - 6.0 %    Basophils % 0.2 0.0 - 2.0 %    Neutrophils Absolute 4.30 1.60 - 5.50 x10*3/uL    Immature Granulocytes Absolute, Automated 0.02 0.00 - 0.50 x10*3/uL    Lymphocytes Absolute 1.39 0.80 - 3.00 x10*3/uL    Monocytes Absolute 0.81 (H) 0.05 - 0.80 x10*3/uL    Eosinophils Absolute 0.09 0.00 - 0.40 x10*3/uL    Basophils Absolute 0.01 0.00 - 0.10 x10*3/uL   Comprehensive Metabolic Panel   Result Value Ref Range    Glucose 105 (H) 74 - 99 mg/dL    Sodium 136 136 - 145 mmol/L    Potassium 4.0 3.5 - 5.3 mmol/L    Chloride 101 98 - 107 mmol/L    Bicarbonate 27 21 - 32 mmol/L    Anion Gap 12 10 - 20 mmol/L    Urea Nitrogen 9 6 - 23 mg/dL     Creatinine 0.62 0.50 - 1.30 mg/dL    eGFR >90 >60 mL/min/1.73m*2    Calcium 8.8 8.6 - 10.3 mg/dL    Albumin 4.0 3.4 - 5.0 g/dL    Alkaline Phosphatase 119 33 - 136 U/L    Total Protein 7.2 6.4 - 8.2 g/dL    AST 25 9 - 39 U/L    Bilirubin, Total 0.3 0.0 - 1.2 mg/dL    ALT 18 10 - 52 U/L   Acute Toxicology Panel, Blood   Result Value Ref Range    Acetaminophen <10.0 10.0 - 30.0 ug/mL    Salicylate  <3 4 - 20 mg/dL    Alcohol <10 <=10 mg/dL   Drug Screen, Urine   Result Value Ref Range    Amphetamine Screen, Urine Presumptive Negative Presumptive Negative    Barbiturate Screen, Urine Presumptive Negative Presumptive Negative    Benzodiazepines Screen, Urine Presumptive Negative Presumptive Negative    Cannabinoid Screen, Urine Presumptive Negative Presumptive Negative    Cocaine Metabolite Screen, Urine Presumptive Negative Presumptive Negative    Fentanyl Screen, Urine Presumptive Negative Presumptive Negative    Opiate Screen, Urine Presumptive Negative Presumptive Negative    Oxycodone Screen, Urine Presumptive Negative Presumptive Negative    PCP Screen, Urine Presumptive Negative Presumptive Negative    Methadone Screen, Urine Presumptive Negative Presumptive Negative   Urinalysis with Reflex Culture and Microscopic   Result Value Ref Range    Color, Urine Colorless (N) Light-Yellow, Yellow, Dark-Yellow    Appearance, Urine Clear Clear    Specific Gravity, Urine 1.006 1.005 - 1.035    pH, Urine 6.5 5.0, 5.5, 6.0, 6.5, 7.0, 7.5, 8.0    Protein, Urine NEGATIVE NEGATIVE, 10 (TRACE), 20 (TRACE) mg/dL    Glucose, Urine Normal Normal mg/dL    Blood, Urine NEGATIVE NEGATIVE    Ketones, Urine NEGATIVE NEGATIVE mg/dL    Bilirubin, Urine NEGATIVE NEGATIVE    Urobilinogen, Urine Normal Normal mg/dL    Nitrite, Urine NEGATIVE NEGATIVE    Leukocyte Esterase, Urine NEGATIVE NEGATIVE        IMAGING  No results found.     PSYCHIATRIC RISK ASSESSMENT  Violence Risk Factors:  male and current psychiatric illness  Acute Risk of Harm  "to Others is Considered: Low  Suicide Risk Factors: male, ; /[place] native, age > 65 years old , chronic medical illness, current psychiatric illness, life crisis (shame/despair), and feelings of hopelessness  Protective Factors: positive family relationships  Acute Risk of Harm to Self is Considered: Moderate    ASSESSMENT AND PLAN  Nguyễn Hernandez is a 74 y.o. male with a past medical history of bipolar I disorder, polysubstance abuse (including alcohol use disorder in remission >30 years), Parkinson's disease, hypertension, hyperlipidemia, hypothyroidism, hx of limbic encephalitis, prior right MCA stroke (9/2018) s/p thrombectomy, and low back pain s/p lumbar fusion who presented to [facility] ED on 9/2/24 for suicidal ideation. EPAT was consulted for further evaluation.     On initial assessment, the patient endorsed increasingly depressed mood over the last several months and self-disclosed a suicide attempt via medication overdose 6 months ago. The patient endorses ongoing suicidal ideation with plan to overdose on medications which is unlikely given his current residence at nursing facility. However, given the patients increasingly depressed mood and lack of protective factors, the patient meets criteria for inpatient psychiatric admission given elevated risk of harm to self.     DIAGNOSIS  - Other specified mood disorder, R/o bipolar I disorder, current episode, depressed    RECOMMENDATIONS  - Patient does  currently meet criteria for inpatient psychiatric admission. Once patient is deemed medically cleared, please document in note that patient is MEDICALLY CLEARED and contact hospitalsT for referral at [phone], pager [phone]. Issue Application for Emergency Admission (pink slip) only after patient is accepted to an inpatient psychiatric unit and is ready to be discharged. Search \"Application for Emergency Admission\" under SmartText.  - Patient lacks the capacity to leave AMA at this time and " "thus cannot leave AMA. Call CODE VIOLET if patient attempts to elope.  - To evaluate decision-making capacity, recommend use of the Capacity Evaluation Tool. Search “[facility] Capacity Evaluation\" under SmartText.  - Call Code Violets as needed for agitated, threatening, and non-redirectable behaviors.  - Patient does require a 1:1 sitter from a psychiatric perspective at this time.  - Patient should be in hospital attire. Please remove/secure personal belongings from the room.  - Will defer any psychotropic medication recommendations to inpatient psychiatric team given lack of clarity of current regimen.     ==========  - Discussed recommendations with primary team.    Patient discussed with Dr. Carpenter, who agrees with above plan.    Jonathan Yeung MD  Adult Psychiatry, PGY-4, on behalf of the Adult Psychiatry EPAT service  EPAT ext [phone]     Medication Consent  Medication Consent: n/a; consult service  "

## 2024-09-02 NOTE — ED PROVIDER NOTES
Emergency Department Provider Note        History of Present Illness     History provided by: Patient and EMS  Limitations to History: None  External Records Reviewed with Brief Summary: None    HPI:  Nguyễn Hernandez is a 74 y.o. male bipolar type I, Parkinson's disease, hypertension, hyperlipidemia, hypothyroidism, prior stroke who presents for suicidal ideation.  He comes from a nursing facility.  He has been feeling depressed over the last 6 months.  He mentions another facility for his nursing home that he was thinking of taking his medications to kill himself.  However, the facility keeps his medications for him so he is unable to do this.  The other resident told the nurse, who had the patient sent emerged part evaluation.  He does report some increased urinary frequency, but denies any other symptoms such as fever, chills, chest pain, shortness breath, abdominal pain, nausea or vomiting.    Physical Exam   Triage vitals:  T 36.8 °C (98.2 °F)  HR 79  /75  RR 18  O2 97 % None (Room air)    Physical Exam  Vitals and nursing note reviewed.   Constitutional:       General: He is not in acute distress.     Appearance: He is well-developed.   HENT:      Head: Normocephalic and atraumatic.      Right Ear: External ear normal.      Left Ear: External ear normal.      Nose: Nose normal.      Mouth/Throat:      Mouth: Mucous membranes are moist.   Eyes:      General: No scleral icterus.     Extraocular Movements: Extraocular movements intact.      Conjunctiva/sclera: Conjunctivae normal.      Pupils: Pupils are equal, round, and reactive to light.   Cardiovascular:      Rate and Rhythm: Normal rate and regular rhythm.      Heart sounds: No murmur heard.  Pulmonary:      Effort: Pulmonary effort is normal. No respiratory distress.      Breath sounds: Normal breath sounds.   Abdominal:      Palpations: Abdomen is soft.      Tenderness: There is no abdominal tenderness.   Genitourinary:     Penis: Normal.        Testes: Normal.   Musculoskeletal:         General: No swelling.      Cervical back: Neck supple. No rigidity.   Skin:     General: Skin is warm and dry.   Neurological:      General: No focal deficit present.      Mental Status: He is alert.   Psychiatric:         Mood and Affect: Mood normal.          Medical Decision Making & ED Course   Medical Decision Makin y.o. male presents with ideations.  On arrival, he is afebrile he medically stable.  He does report some dysuria, but no other symptoms.  Will obtain urinalysis to eval for any signs of infection.  Given that he has been having suicidal ideations, we will medically clear him for evaluation by EPAT.  He denies any homicidal Nations.    CBC negative for leukocytosis.  Anemia of 13.1, which is slightly better than his baseline of 11.  Chemistry panel is unremarkable.  Urinalysis negative for any signs infection or blood.  Negative urine drug screen.  Tylenol, alcohol, aspirin levels negative.    Patient is medically cleared for evaluation by EPAT.  Patient was evaluated by EPAT and they have recommended inpatient admission.  EPAT is working on placement.    We contacted the patient's nursing facility, [facility].  Patient's medication list was faxed over and he has been restarted on his psychiatric, neurological, and cardiovascular medications.    Patient is signed out to Dr. Hall pending placement by [facility].    Edward Felix DO, PGY 4  Emergency Medicine Resident  ----      Differential diagnoses considered include but are not limited to: Urinary tract infection, suicide ideations     Social Determinants of Health which Significantly Impact Care: None identified     EKG Independent Interpretation: EKG interpreted by myself. Please see ED Course for full interpretation.    Independent Result Review and Interpretation: Relevant laboratory and radiographic results were reviewed and independently interpreted by myself.  As necessary, they are commented on in  the ED Course.    Chronic conditions affecting the patient's care: As documented above in MDM    The patient was discussed with the following consultants/services:  EPAT    Care Considerations: As documented above in [facility]    ED Course:  ED Course as of 09/03/24 0111   Mon Sep 02, 2024   2051 EPAT has evaluated patient.  They recommend inpatient psychiatric placement. [AL]   2306 EKG taken at 1759 on 2 September 2024 showing normal sinus bradycardia with a rate of 59, normal axis, normal intervals, no signs of acute ST elevation or depression [DS]      ED Course User Index  [AL] Edward Felix DO  [DS] Isma Rosario MD         Diagnoses as of 09/03/24 0111   Failure to thrive in adult     Disposition   Patient was signed out to Dr. Hall at 0200 pending completion of their work-up.  Please see the next provider's transition of care note for the remainder of the patient's care.     Procedures   Procedures    Patient seen and discussed with ED attending physician.    Edward Felix DO  Emergency Medicine     Edward Felix DO  Resident  09/03/24 0113

## 2024-09-03 VITALS
HEART RATE: 68 BPM | SYSTOLIC BLOOD PRESSURE: 152 MMHG | OXYGEN SATURATION: 97 % | WEIGHT: 161 LBS | BODY MASS INDEX: 29.63 KG/M2 | DIASTOLIC BLOOD PRESSURE: 83 MMHG | RESPIRATION RATE: 16 BRPM | HEIGHT: 62 IN | TEMPERATURE: 96.8 F

## 2024-09-03 LAB
ATRIAL RATE: 59 BPM
HOLD SPECIMEN: NORMAL
P AXIS: 64 DEGREES
P OFFSET: 188 MS
P ONSET: 123 MS
PR INTERVAL: 198 MS
Q ONSET: 222 MS
QRS COUNT: 10 BEATS
QRS DURATION: 88 MS
QT INTERVAL: 414 MS
QTC CALCULATION(BAZETT): 409 MS
QTC FREDERICIA: 411 MS
R AXIS: 70 DEGREES
T AXIS: 55 DEGREES
T OFFSET: 429 MS
VENTRICULAR RATE: 59 BPM

## 2024-09-03 PROCEDURE — 2500000001 HC RX 250 WO HCPCS SELF ADMINISTERED DRUGS (ALT 637 FOR MEDICARE OP)

## 2024-09-03 RX ORDER — THIAMINE HYDROCHLORIDE 100 MG/ML
100 INJECTION, SOLUTION INTRAMUSCULAR; INTRAVENOUS ONCE
Status: DISCONTINUED | OUTPATIENT
Start: 2024-09-03 | End: 2024-09-03

## 2024-09-03 RX ORDER — CARBIDOPA AND LEVODOPA 25; 100 MG/1; MG/1
1 TABLET, EXTENDED RELEASE ORAL ONCE
Status: COMPLETED | OUTPATIENT
Start: 2024-09-03 | End: 2024-09-03

## 2024-09-03 RX ORDER — ATORVASTATIN CALCIUM 80 MG/1
80 TABLET, FILM COATED ORAL ONCE
Status: DISCONTINUED | OUTPATIENT
Start: 2024-09-03 | End: 2024-09-03 | Stop reason: HOSPADM

## 2024-09-03 RX ORDER — CITALOPRAM 10 MG/1
10 TABLET ORAL ONCE
Status: COMPLETED | OUTPATIENT
Start: 2024-09-03 | End: 2024-09-03

## 2024-09-03 RX ORDER — TRAZODONE HYDROCHLORIDE 50 MG/1
25 TABLET ORAL NIGHTLY PRN
Status: DISCONTINUED | OUTPATIENT
Start: 2024-09-03 | End: 2024-09-03 | Stop reason: HOSPADM

## 2024-09-03 RX ORDER — PANTOPRAZOLE SODIUM 40 MG/1
40 TABLET, DELAYED RELEASE ORAL ONCE
Status: COMPLETED | OUTPATIENT
Start: 2024-09-03 | End: 2024-09-03

## 2024-09-03 RX ORDER — DIVALPROEX SODIUM 125 MG/1
375 CAPSULE, COATED PELLETS ORAL ONCE
Status: COMPLETED | OUTPATIENT
Start: 2024-09-03 | End: 2024-09-03

## 2024-09-03 RX ORDER — CLOPIDOGREL BISULFATE 75 MG/1
75 TABLET ORAL ONCE
Status: COMPLETED | OUTPATIENT
Start: 2024-09-03 | End: 2024-09-03

## 2024-09-03 RX ORDER — LANOLIN ALCOHOL/MO/W.PET/CERES
100 CREAM (GRAM) TOPICAL ONCE
Status: COMPLETED | OUTPATIENT
Start: 2024-09-03 | End: 2024-09-03

## 2024-09-03 RX ORDER — LEVOTHYROXINE SODIUM 50 UG/1
50 TABLET ORAL ONCE
Status: COMPLETED | OUTPATIENT
Start: 2024-09-03 | End: 2024-09-03

## 2024-09-03 RX ORDER — ASPIRIN 81 MG/1
81 TABLET ORAL ONCE
Status: COMPLETED | OUTPATIENT
Start: 2024-09-03 | End: 2024-09-03

## 2024-09-03 SDOH — HEALTH STABILITY: MENTAL HEALTH: SLEEP PATTERN: DIFFICULTY FALLING ASLEEP;INSOMNIA;RESTLESSNESS

## 2024-09-03 SDOH — HEALTH STABILITY: MENTAL HEALTH: DELUSIONS: OTHER (COMMENT)

## 2024-09-03 SDOH — HEALTH STABILITY: MENTAL HEALTH: NEEDS EXPRESSED: DENIES

## 2024-09-03 SDOH — HEALTH STABILITY: MENTAL HEALTH: CONTENT: UNABLE TO ASSESS

## 2024-09-03 SDOH — HEALTH STABILITY: MENTAL HEALTH

## 2024-09-03 SDOH — HEALTH STABILITY: MENTAL HEALTH: BEHAVIORS/MOOD: CALM;COOPERATIVE

## 2024-09-03 SDOH — SOCIAL STABILITY: SOCIAL NETWORK: EMOTIONAL SUPPORT GIVEN: REASSURE

## 2024-09-03 SDOH — HEALTH STABILITY: MENTAL HEALTH: HAVE YOU EVER DONE ANYTHING, STARTED TO DO ANYTHING, OR PREPARED TO DO ANYTHING TO END YOUR LIFE?: NO

## 2024-09-03 SDOH — SOCIAL STABILITY: SOCIAL NETWORK: VISITOR BEHAVIORS: UNABLE TO ASSESS

## 2024-09-03 SDOH — HEALTH STABILITY: MENTAL HEALTH: HAVE YOU ACTUALLY HAD ANY THOUGHTS OF KILLING YOURSELF?: NO

## 2024-09-03 SDOH — HEALTH STABILITY: MENTAL HEALTH: SUICIDE ASSESSMENT: ADULT (C-SSRS)

## 2024-09-03 SDOH — HEALTH STABILITY: MENTAL HEALTH: HAVE YOU WISHED YOU WERE DEAD OR WISHED YOU COULD GO TO SLEEP AND NOT WAKE UP?: NO

## 2024-09-03 SDOH — SOCIAL STABILITY: SOCIAL INSECURITY: FAMILY BEHAVIORS: UNABLE TO ASSESS

## 2024-09-03 SDOH — SOCIAL STABILITY: SOCIAL NETWORK: PARENT/GUARDIAN/SIGNIFICANT OTHER INVOLVEMENT: NO INVOLVEMENT

## 2024-09-03 ASSESSMENT — PAIN SCALES - GENERAL: PAINLEVEL_OUTOF10: 0 - NO PAIN

## 2024-09-03 NOTE — SIGNIFICANT EVENT
Application for Emergency Admission      Ready for Transfer?  Is the patient medically cleared for transfer to inpatient psychiatry: Yes  Has the patient been accepted to an inpatient psychiatric hospital: Yes    Application for Emergency Admission  IN ACCORDANCE WITH [place]  The Chief Clinical Officer of: [facility] 9/3/2024 .5:40 AM    Reason for Hospitalization  The undersigned has reason to believe that: Nguyễn Hernandez Is a mentally ill person subject to hospitalization by court order under division B Section 5122.01 of the Revised Code, i.e., this person:    1.Yes  Represents a substantial risk of physical harm to self as manifested by evidence of threats of, or attempts at, suicide or serious self-inflicted bodily harm    2.No Represents a substantial risk of physical harm to others as manifested by evidence of recent homicidal or other violent behavior, evidence of recent threats that place another in reasonable fear of violent behavior and serious physical harm, or other evidence of present dangerousness    3.Yes Represents a substantial and immediate risk of serious physical impairment or injury to self as manifested by  evidence that the person is unable to provide for and is not providing for the person's basic physical needs because of the person's mental illness and that appropriate provision for those needs cannot be made  immediately available in the community    4.Yes Would benefit from treatment in a hospital for his mental illness and is in need of such treatment as manifested by evidence of behavior that creates a grave and imminent risk to substantial rights of others or  himself.    5.Yes Would benefit from treatment as manifested by evidence of behavior that indicates all of the following:       (a) The person is unlikely to survive safely in the community without supervision, based on a clinical determination.       (b) The person has a history of lack of compliance with  treatment for mental illness and one of the following applies:      (i) At least twice within the thirty-six months prior to the filing of an affidavit seeking court-ordered treatment of the person under section 5122.111 of the Revised Code, the lack of compliance has been a significant factor in necessitating hospitalization in a hospital or receipt of services in a forensic or other mental health unit of a correctional facility, provided that the thirty-six-month period shall be extended by the length of any hospitalization or incarceration of the person that occurred within the thirty-six-month period.      (ii) Within the forty-eight months prior to the filing of an affidavit seeking court-ordered treatment of the person under section 5122.111 of the Revised Code, the lack of compliance resulted in one or more acts of serious violent behavior toward self or others or threats of, or attempts at, serious physical harm to self or others, provided that the forty-eight-month period shall be extended by the length of any hospitalization or incarceration of the person that occurred within the forty-eight-month period.      (c) The person, as a result of mental illness, is unlikely to voluntarily participate in necessary treatment.       (d) In view of the person's treatment history and current behavior, the person is in need of treatment in order to prevent a relapse or deterioration that would be likely to result in substantial risk of serious harm to the person or others.    (e) Represents a substantial risk of physical harm to self or others if allowed to remain at liberty pending examination.    Therefore, it is requested that said person be admitted to the above named facility.    STATEMENT OF BELIEF    Must be filled out by one of the following: a psychiatrist, licensed physician, licensed clinical psychologist, health or ,  or .  (Statement shall include the circumstances under  which the individual was taken into custody and the reason for the person's belief that hospitalization is necessary. The statement shall also include a reference to efforts made to secure the individual's property at his residence if he was taken into custody there. Every reasonable and appropriate effort should be made to take this person into custody in the least conspicuous manner possible.)    SI with plan to overdose    Salvatore Hall, DO 9/3/2024     _____________________________________________________________   Place of Employment: [facility] [place] emergency department    STATEMENT OF OBSERVATION BY PSYCHIATRIST, LICENSED PHYSICIAN, OR LICENSED CLINICAL PSYCHOLOGIST, IF APPLICABLE    Place of Observation (e.g., [facility] center, [facility] hospital, office, emergency facility)  (If applicable, please complete)    Salvatore Hall,  9/3/2024    _____________________________________________________________

## 2024-09-03 NOTE — PROGRESS NOTES
Emergency Medicine Transition of Care Note.    I received Nguyễn Hernandez in signout from Dr. Felix.  Please see the previous ED provider note for all HPI, PE and MDM up to the time of signout. This is in addition to the primary record.    In brief Nguyễn Hernandez is an 74 y.o. male presenting for SI with plan to overdose on medications  Chief Complaint   Patient presents with    Psychiatric Evaluation     Pt frustrated with care at nursing home. Not sleeping, dealing with other residents. Has had thoughts of not wanting to be here with no active plan.      At the time of signout we were awaiting: Placement by [facility].    ED Course as of 09/03/24 0558   Mon Sep 02, 2024   2051 [facility] has evaluated patient.  They recommend inpatient psychiatric placement. [AL]   2306 EKG taken at 1759 on 2 September 2024 showing normal sinus bradycardia with a rate of 59, normal axis, normal intervals, no signs of acute ST elevation or depression [DS]   Tue Sep 03, 2024   0546 Patient signed out to me pending placement given his suicidal ideation and having active plan of killing himself.  [facility] is accepted [facility]. [ME]      ED Course User Index  [AL] Edward Felix DO  [DS] Isma Rosario MD  [ME] Chevy Stein DO         Diagnoses as of 09/03/24 0558   Failure to thrive in adult   Suicidal ideation       Medical Decision Making      Final diagnoses:   [R62.7] Failure to thrive in adult   [R45.851] Suicidal ideation           Procedure  Procedures    Salvatore Perez DO     Reviewed and approved by SALVATORE PEREZ on 9/3/24 at 5:58 AM.

## 2024-09-03 NOTE — PROGRESS NOTES
"Capacity Assessment Tool    \"Capacity\" is the \"ability\" to make a decision.  The decision in question must be specific (one decision), relevant to a patient's current condition (appropriate), and timely (neither prospective nor retrospective).    Capacity varies based on knowledge base (explanation/understanding of clinical information), cognitive processing, acute psychiatric illness, and other clinical conditions.    In order to be deemed \"capacitated\" to make a single decision at one point in time, a patient must demonstrate all 4 of the following elements:    *Ability to consistently communicate a choice (consistent over time with adequate information)  *Ability to understand the relevant information (accurate knowledge of condition)  *Ability to appreciate the situation and its consequences (risks/benefits, pros/cons)  *Ability to reason about treatment options (without undue influence of a person or condition, eg. suicidality or acute psychosis)      Current Decision    Clinical issue:   Suicidal ideations    Did the appropriate team address relevant information with the patient:  Yes    Date: 9/2/2024    If \"NO\" is selected for appropriate team, then please discuss with the appropriate team.  The appropriate team should be encouraged to address relevant information with the patient AND reevaluate capacity when appropriate.    Capacity Evaluation    Patient demonstrates ability to consistently communicate choice:  Yes     Patient demonstrates ability to understand the relevant information:  Yes     Patient demonstrates ability to appreciate the situation and its consequences:  No     Patient demonstrates ability to reason about treatment options:  Yes     If ANY of the above items are answered \"NO,\" the patient LACKS CAPACITY for that specific decision at hand, at that specific time.  Further capacity evaluations can be done as needed.         "

## 2024-09-04 ENCOUNTER — APPOINTMENT (OUTPATIENT)
Dept: ORTHOPEDIC SURGERY | Facility: CLINIC | Age: 74
End: 2024-09-04
Payer: COMMERCIAL

## 2024-09-16 LAB
ATRIAL RATE: 59 BPM
P AXIS: 64 DEGREES
P OFFSET: 188 MS
P ONSET: 123 MS
PR INTERVAL: 198 MS
Q ONSET: 222 MS
QRS COUNT: 10 BEATS
QRS DURATION: 88 MS
QT INTERVAL: 414 MS
QTC CALCULATION(BAZETT): 409 MS
QTC FREDERICIA: 411 MS
R AXIS: 70 DEGREES
T AXIS: 55 DEGREES
T OFFSET: 429 MS
VENTRICULAR RATE: 59 BPM

## 2024-10-02 ENCOUNTER — HOSPITAL ENCOUNTER (OUTPATIENT)
Dept: RADIOLOGY | Facility: CLINIC | Age: 74
Discharge: HOME | End: 2024-10-02
Payer: COMMERCIAL

## 2024-10-02 ENCOUNTER — OFFICE VISIT (OUTPATIENT)
Dept: ORTHOPEDIC SURGERY | Facility: CLINIC | Age: 74
End: 2024-10-02
Payer: COMMERCIAL

## 2024-10-02 DIAGNOSIS — G56.20 ULNAR NEURITIS, UNSPECIFIED LATERALITY: ICD-10-CM

## 2024-10-02 DIAGNOSIS — S62.337A DISPLACED FRACTURE OF NECK OF FIFTH METACARPAL BONE, LEFT HAND, INITIAL ENCOUNTER FOR CLOSED FRACTURE: ICD-10-CM

## 2024-10-02 DIAGNOSIS — M79.644 FINGER PAIN, RIGHT: ICD-10-CM

## 2024-10-02 DIAGNOSIS — G56.00 CARPAL TUNNEL SYNDROME, UNSPECIFIED LATERALITY: ICD-10-CM

## 2024-10-02 PROCEDURE — 73140 X-RAY EXAM OF FINGER(S): CPT | Mod: LT

## 2024-10-02 PROCEDURE — 99214 OFFICE O/P EST MOD 30 MIN: CPT | Performed by: FAMILY MEDICINE

## 2024-10-02 PROCEDURE — 73140 X-RAY EXAM OF FINGER(S): CPT | Mod: RIGHT SIDE | Performed by: RADIOLOGY

## 2024-10-02 PROCEDURE — 73140 X-RAY EXAM OF FINGER(S): CPT | Mod: RT

## 2024-10-02 PROCEDURE — 1160F RVW MEDS BY RX/DR IN RCRD: CPT | Performed by: FAMILY MEDICINE

## 2024-10-02 PROCEDURE — 1036F TOBACCO NON-USER: CPT | Performed by: FAMILY MEDICINE

## 2024-10-02 PROCEDURE — L3908 WHO COCK-UP NONMOLDE PRE OTS: HCPCS | Performed by: FAMILY MEDICINE

## 2024-10-02 PROCEDURE — 1159F MED LIST DOCD IN RCRD: CPT | Performed by: FAMILY MEDICINE

## 2024-10-02 PROCEDURE — 99213 OFFICE O/P EST LOW 20 MIN: CPT | Performed by: FAMILY MEDICINE

## 2024-10-02 RX ORDER — METHYLPREDNISOLONE 4 MG/1
TABLET ORAL
Qty: 21 TABLET | Refills: 0 | Status: SHIPPED | OUTPATIENT
Start: 2024-10-02

## 2024-10-02 NOTE — PROGRESS NOTES
Established Patient Follow-Up Visit    CC:   Chief Complaint   Patient presents with    Left Hand - Follow-up, Pain     Small metcarpal fx   Xrays today        HPI:  Nguyễn is a 74 y.o. male returns here today for follow-up visit regarding: Follow-up of his left fifth metacarpal neck fracture.  He states overall that area of his hand and wrist is fine.  He does describe some increased numbness tingling or burning throughout the head notably across all the digits.  He also has complaints of chronic 30-year plus history of a previous right small finger injury which she would like us to take a look at today.  We have provided him with an Occupational Therapy prescription however per patient nobody told him about it and he was not doing any occupational therapy at the facility.          REVIEW OF SYSTEMS:  GENERAL: Negative for malaise, significant weight loss, fever  MUSCULOSKELETAL: See HPI  NEURO: Positive for numbness / tingling       PHYSICAL EXAM:  -Neuro: Gross sensation intact to the upper extremities bilaterally.  -Extremity: Left upper extremity exam demonstrates skin which is warm pink well-perfused there is some muscle atrophy noted about the pad of the thumb.  He has the ability to grasp and  with a gentle strength.  He has full ability to flex and extend the fifth MCP.  There is no bony tenderness at the previous fracture site.  He has positive tenderness and discomfort over the cubital tunnel indicative of ulnar neuritis.  He also has positive Tinel and Phalen signs at the left wrist.  Right small finger demonstrates obvious valgus deformity at the PIP with chronic subluxation and crepitus noted no redness or erythema.  He is unable to fully flex the digit due to the malrotation about 45 degrees of valgus deformity.  MCP is unaffected the remainder of the right upper extremity is otherwise neurovascular intact and benign.    IMAGING: Repeat left hand films today demonstrate stable interval healing  minimally displaced angulated left fifth metacarpal neck fracture.  3 new views of the right small finger demonstrate chronic degenerative subluxing with varus deformity of the right fifth PIP joint likely secondary to prior trauma.  No presence for acute fracture.      PROCEDURE: None  Procedures     ASSESSMENT:   Follow-up visit for:  Problem List Items Addressed This Visit    None  Visit Diagnoses       Displaced fracture of neck of fifth metacarpal bone, left hand, initial encounter for closed fracture        Relevant Orders    XR fingers left 2+ views    Finger pain, right        Relevant Orders    XR fingers right 2+ views    Carpal tunnel syndrome, unspecified laterality        Relevant Medications    methylPREDNISolone (Medrol Dospak) 4 mg tablets    Other Relevant Orders    EMG & nerve conduction    Wrist brace    Ulnar neuritis, unspecified laterality        Relevant Medications    methylPREDNISolone (Medrol Dospak) 4 mg tablets    Other Relevant Orders    EMG & nerve conduction             PLAN: At this time we discussed with the patient that we can consider maggy taping for his right chronic subluxed fifth digit.  Additionally we will submit for upper extremity nerve conduction EMG study to assess for ulnar neuritis and carpal tunnel syndrome on the left side.  We will submit prior authorization to insurance for injections 1 to the cubital tunnel and 1 to the carpal tunnel.  Will also offer him a simple wrist pro brace here today for his carpal tunnel syndrome on the left.  Will see him back once the nerve conduction study is complete and hopefully be able to provide him with 2 injections at that time if necessary.  Additionally we can then have him follow-up down the road with one of her hand and wrist specialist to further address any residual carpal tunnel syndromes as well as his chronically subluxed right fifth finger.  Orders Placed This Encounter    Wrist brace    XR fingers left 2+ views    XR  fingers right 2+ views    EMG & nerve conduction    methylPREDNISolone (Medrol Dospak) 4 mg tablets           At the conclusion of the visit there were no further questions by the patient/family regarding their plan of care.  Patient was instructed to call or return with any issues, questions, or concerns regarding their injury and/or treatment plan described above.     10/02/24 at 11:57 AM - Cole C Budinsky, MD    Office: [phone]    This note was prepared using voice recognition software.  The details of this note are correct and have been reviewed, and corrected to the best of my ability.  Some grammatical errors may persist related to the Dragon software.

## 2024-11-18 ENCOUNTER — ANCILLARY PROCEDURE (OUTPATIENT)
Dept: ORTHOPEDIC SURGERY | Facility: CLINIC | Age: 74
End: 2024-11-18
Payer: MEDICARE

## 2024-11-18 ENCOUNTER — HOSPITAL ENCOUNTER (OUTPATIENT)
Dept: RADIOLOGY | Facility: EXTERNAL LOCATION | Age: 74
Discharge: HOME | End: 2024-11-18

## 2024-11-18 ENCOUNTER — APPOINTMENT (OUTPATIENT)
Dept: ORTHOPEDIC SURGERY | Facility: CLINIC | Age: 74
End: 2024-11-18
Payer: COMMERCIAL

## 2024-11-18 DIAGNOSIS — M54.12 CERVICAL RADICULOPATHY: ICD-10-CM

## 2024-11-18 DIAGNOSIS — R29.898 LEFT ARM WEAKNESS: ICD-10-CM

## 2024-11-18 DIAGNOSIS — M19.042 OA (OSTEOARTHRITIS) OF FINGER, LEFT: ICD-10-CM

## 2024-11-18 PROCEDURE — 20600 DRAIN/INJ JOINT/BURSA W/O US: CPT | Performed by: FAMILY MEDICINE

## 2024-11-18 PROCEDURE — 72050 X-RAY EXAM NECK SPINE 4/5VWS: CPT | Performed by: FAMILY MEDICINE

## 2024-11-18 PROCEDURE — 1160F RVW MEDS BY RX/DR IN RCRD: CPT | Performed by: FAMILY MEDICINE

## 2024-11-18 PROCEDURE — 1036F TOBACCO NON-USER: CPT | Performed by: FAMILY MEDICINE

## 2024-11-18 PROCEDURE — 1159F MED LIST DOCD IN RCRD: CPT | Performed by: FAMILY MEDICINE

## 2024-11-18 PROCEDURE — 99214 OFFICE O/P EST MOD 30 MIN: CPT | Performed by: FAMILY MEDICINE

## 2024-11-18 RX ORDER — TRIAMCINOLONE ACETONIDE 40 MG/ML
20 INJECTION, SUSPENSION INTRA-ARTICULAR; INTRAMUSCULAR
Status: COMPLETED | OUTPATIENT
Start: 2024-11-18 | End: 2024-11-18

## 2024-11-18 NOTE — PROGRESS NOTES
Established Patient Follow-Up Visit    CC:   Chief Complaint   Patient presents with    Left Hand - Follow-up, Pain     Small metacarpal fx   USG-CTS ulnar neuritis inj.  EMG schedule for 12/12/24           HPI:  Nguyễn is a 74 y.o. male returns here today for follow-up visit regarding: Follow-up worsening fifth metacarpal pain, congestion, worsening numbness tingling to the left upper extremity.  Patient feels a lot of his issues are actually coming from his neck.  He has a lot of neck stiffness he states when he moves rotates his head and neck around he develops worsening pain down the left arm.          REVIEW OF SYSTEMS:  GENERAL: Negative for malaise, significant weight loss, fever  MUSCULOSKELETAL: See HPI  NEURO: Positive for numbness / tingling       PHYSICAL EXAM:  -Neuro: Gross sensation intact to the upper extremities bilaterally.  -Extremity: Head and neck exam demonstrates no tenderness down the midline left worse than right paraspinal spasms and mild trapezius tenderness on the left none on the right.  Very limited range of motion with forward flexion and neck extension sidebending and rotation.  Crepitus noted with forward out of 5 strength with resisted forward flexion and lateral abduction.  4-5 supraspinatus testing about the shoulder forearm compartments are soft and compressible mild Tinel's and Phalen's at the wrist tenderness over the fifth metacarpal with swelling and a small joint effusion minimal pain to the medial and lateral side of the elbow.  Positive Spurling sign on the left    IMAGING: Cervical spine films today demonstrate reversal of the cervical lordotic curve in addition to a significant ventral osteophytes and moderate to severe degenerative disc disease seen throughout.      PROCEDURE: Left fifth MCP joint injection as below  Hand / UE Inj/Asp: L small MCP for osteoarthritis on 11/18/2024 12:51 PM  Indications: diagnostic, joint swelling, pain and therapeutic  Details: 25 G  needle, ultrasound-guided ulnar approach  Medications: 20 mg triamcinolone acetonide 40 mg/mL  Outcome: tolerated well, no immediate complications  Procedure, treatment alternatives, risks and benefits explained, specific risks discussed. Consent was given by the patient. Immediately prior to procedure a time out was called to verify the correct patient, procedure, equipment, support staff and site/side marked as required. Patient was prepped and draped in the usual sterile fashion.            ASSESSMENT:   Follow-up visit for:  Problem List Items Addressed This Visit    None  Visit Diagnoses       Cervical radiculopathy        Relevant Orders    XR cervical spine complete 4-5 views    Referral to Physical Therapy    Referral to Chiropractic Medicine    Left arm weakness        Relevant Orders    XR cervical spine complete 4-5 views    Referral to Physical Therapy    Referral to Chiropractic Medicine    OA (osteoarthritis) of finger, left        Relevant Orders    Point of Care Ultrasound (Completed)             PLAN: Patient was provided with the MCP joint injection here today.  I held off and defer to the carpal tunnel and ulnar neuritis type injections as they feel a lot of his discomfort is coming from his neck.  Will obtain further [facility] imaging of the cervical spine for herniated disc versus nerve impingement due to the weakness and worsening pain.  He was interested in physical therapy chiropractic care and concurrently has scheduled nerve conduction EMG study of the upper extremity.  Will see him back once the MRI and the nerve conduction studies are complete.  Call return sooner with any issues.    Orders Placed This Encounter    Hand / UE Inj/Asp    XR cervical spine complete 4-5 views    Point of Care Ultrasound    Referral to Physical Therapy    Referral to Chiropractic Medicine           At the conclusion of the visit there were no further questions by the patient/family regarding their plan of care.   Patient was instructed to call or return with any issues, questions, or concerns regarding their injury and/or treatment plan described above.     11/18/24 at 12:53 PM - Cole C Budinsky, MD    Office: [phone]    This note was prepared using voice recognition software.  The details of this note are correct and have been reviewed, and corrected to the best of my ability.  Some grammatical errors may persist related to the Dragon software.

## 2024-11-27 ENCOUNTER — APPOINTMENT (OUTPATIENT)
Dept: NEPHROLOGY | Facility: CLINIC | Age: 74
End: 2024-11-27
Payer: COMMERCIAL

## 2024-12-12 ENCOUNTER — HOSPITAL ENCOUNTER (OUTPATIENT)
Dept: NEUROLOGY | Facility: HOSPITAL | Age: 74
Discharge: HOME | End: 2024-12-12
Payer: MEDICARE

## 2024-12-12 DIAGNOSIS — G56.00 CARPAL TUNNEL SYNDROME, UNSPECIFIED LATERALITY: ICD-10-CM

## 2024-12-12 DIAGNOSIS — G56.20 ULNAR NEURITIS, UNSPECIFIED LATERALITY: ICD-10-CM

## 2024-12-12 PROCEDURE — 95886 MUSC TEST DONE W/N TEST COMP: CPT

## 2024-12-20 ENCOUNTER — HOSPITAL ENCOUNTER (OUTPATIENT)
Dept: RADIOLOGY | Facility: EXTERNAL LOCATION | Age: 74
Discharge: HOME | End: 2024-12-20

## 2024-12-20 ENCOUNTER — OFFICE VISIT (OUTPATIENT)
Dept: ORTHOPEDIC SURGERY | Facility: CLINIC | Age: 74
End: 2024-12-20
Payer: COMMERCIAL

## 2024-12-20 DIAGNOSIS — G56.02 CARPAL TUNNEL SYNDROME ON LEFT: ICD-10-CM

## 2024-12-20 DIAGNOSIS — M79.642 PAIN OF LEFT HAND: Primary | ICD-10-CM

## 2024-12-20 PROCEDURE — 2500000004 HC RX 250 GENERAL PHARMACY W/ HCPCS (ALT 636 FOR OP/ED): Performed by: FAMILY MEDICINE

## 2024-12-20 PROCEDURE — 76942 ECHO GUIDE FOR BIOPSY: CPT | Performed by: FAMILY MEDICINE

## 2024-12-20 PROCEDURE — 99213 OFFICE O/P EST LOW 20 MIN: CPT | Performed by: FAMILY MEDICINE

## 2024-12-20 RX ORDER — BETAMETHASONE SODIUM PHOSPHATE AND BETAMETHASONE ACETATE 3; 3 MG/ML; MG/ML
6 INJECTION, SUSPENSION INTRA-ARTICULAR; INTRALESIONAL; INTRAMUSCULAR; SOFT TISSUE
Status: COMPLETED | OUTPATIENT
Start: 2024-12-20 | End: 2024-12-20

## 2024-12-20 RX ORDER — LIDOCAINE HYDROCHLORIDE 10 MG/ML
1 INJECTION, SOLUTION INFILTRATION; PERINEURAL
Status: COMPLETED | OUTPATIENT
Start: 2024-12-20 | End: 2024-12-20

## 2024-12-20 NOTE — PROGRESS NOTES
Established Patient Follow-Up Visit    CC:   Chief Complaint   Patient presents with    Left Arm - Pain    Right Arm - Pain       HPI:  Nguyễn is a 74 y.o. male returns here today for follow-up visit regarding: Follow-up bilateral carpal tunnel symptoms and nerve conduction EMG.  He is agreeable to left carpal tunnel wrist injection here today.  He would like to follow-up with one of our surgeons going forward.  He states bilateral wrist discomfort left worse than right also has some left shoulder discomfort.          REVIEW OF SYSTEMS:  GENERAL: Negative for malaise, significant weight loss, fever  MUSCULOSKELETAL: See HPI  NEURO: Positive for numbness / tingling       PHYSICAL EXAM:  -Neuro: Gross sensation intact to the upper extremities bilaterally.  -Extremity: Left shoulder demonstrates tenderness palpation over the subacromial bursa he has limited range of motion forward flexion and 90 lateral abduction to 75.  Left wrist demonstrates some thenar muscle atrophy about the palm there is a question of a minimal trigger of the right ring finger.  Skin is otherwise warm pink well-perfused no open cuts wounds or sores.  Right hand similarly examined good distal pulses and sensation are intact  is equal somewhat weak bilaterally.    IMAGING: No new imaging today, previous nerve conduction EMG studies demonstrate mild to moderate bilateral carpal tunnel syndrome with concern for potential right sided radicular changes  Point of Care Ultrasound  These images are not reportable by radiology and will not be interpreted   by  Radiologists.      PROCEDURE: Carpal tunnel injection as below  Hand / UE Inj/Asp: L carpal tunnel for carpal tunnel syndrome on 12/20/2024 8:50 AM  Indications: diagnostic, pain and therapeutic  Details: 25 G needle, ultrasound-guided ulnar approach  Medications: 1 mL lidocaine 10 mg/mL (1 %); 6 mg betamethasone acet,sod phos 6 mg/mL  Outcome: tolerated well, no immediate  complications  Procedure, treatment alternatives, risks and benefits explained, specific risks discussed. Consent was given by the patient. Immediately prior to procedure a time out was called to verify the correct patient, procedure, equipment, support staff and site/side marked as required. Patient was prepped and draped in the usual sterile fashion.            ASSESSMENT:   Follow-up visit for:  Problem List Items Addressed This Visit    None  Visit Diagnoses       Pain of left hand    -  Primary    Relevant Orders    Point of Care Ultrasound (Completed)    Carpal tunnel syndrome on left        Relevant Orders    Point of Care Ultrasound (Completed)             PLAN: Patient provided with a carpal tunnel injection here today.  We will plan to see him back in my clinic in about 6 weeks for repeat evaluation for possible left shoulder injection.  Additionally he will follow-up with Dr. Regan Kuhn going forward for his bilateral carpal tunnel symptoms.  Additionally if necessary we can offer him a pain management referral for neck injections for the right.    Please provide local wound care and keep an eye on the left wrist after the injection here today for the next day or 2.  He may utilize ice Tylenol if able and anti-inflammatories if able to assist with pain control.  Should there be any worsening issues or concerns or if he develops any irritation or redness or any drainage from the injection please call or return immediately.  Orders Placed This Encounter    Hand / UE Inj/Asp    Point of Care Ultrasound           At the conclusion of the visit there were no further questions by the patient/family regarding their plan of care.  Patient was instructed to call or return with any issues, questions, or concerns regarding their injury and/or treatment plan described above.     12/20/24 at 8:51 AM - Cole C Budinsky, MD    Office: [phone]    This note was prepared using voice recognition software.  The  details of this note are correct and have been reviewed, and corrected to the best of my ability.  Some grammatical errors may persist related to the Dragon software.

## 2025-01-03 ENCOUNTER — APPOINTMENT (OUTPATIENT)
Dept: NEUROLOGY | Facility: HOSPITAL | Age: 75
End: 2025-01-03
Payer: COMMERCIAL

## 2025-01-09 ENCOUNTER — OFFICE VISIT (OUTPATIENT)
Dept: ORTHOPEDIC SURGERY | Facility: CLINIC | Age: 75
End: 2025-01-09
Payer: COMMERCIAL

## 2025-01-09 DIAGNOSIS — G56.02 CARPAL TUNNEL SYNDROME OF LEFT WRIST: Primary | ICD-10-CM

## 2025-01-09 PROCEDURE — 99214 OFFICE O/P EST MOD 30 MIN: CPT | Performed by: ORTHOPAEDIC SURGERY

## 2025-01-09 NOTE — PROGRESS NOTES
History present illness: Patient presents today for evaluation of left upper extremity pain numbness tingling and cramping.  EMG shows evidence for mild to moderate bilateral carpal tunnel syndrome along with a right C5-6 radiculopathy.  Possible component of peripheral neuropathy.  History of Parkinson's disease.  Symptomatic on the left more than the right.      Past medical history: The patient's past medical history, family history, social history, and review of systems were documented on the patient medical intake.  The updated data was reviewed in the electronic medical record.  History is negative except otherwise stated in history of present illness.        Physical examination:  General: Alert and oriented to person, place, and time.  No acute distress and breathing comfortably: Pleasant and cooperative with examination.  HEENT: Head is normocephalic and atraumatic.  Neck: Supple, no visible swelling.  Cardiovascular: No palpable tachycardia  Lungs: No audible wheezing or labored breathing  Abdomen: Nondistended.  Extremities: Evaluation of the left upper extremity finds the patient had palpable radial artery at the wrist with brisk capillary refill to all digits.  Patient has intact sensation to axillary radial median and ulnar nerves.  There are no open wounds.  There are no signs of infection.  There is no evidence of lymphedema or lymphatic streaking.  The patient has supple compartments to left arm forearm and hand.  Positive Tinel's over course of median nerve to left wrist.  Positive dry compression test and Phalen's maneuver.      Radiology:      Assessment: Left carpal tunnel syndrome with failure of nonoperative treatment strategies      Plan: Treatment options were discussed including both operative and nonoperative strategies.  Patient got distinct relief from the left carpal tunnel injection but now symptoms have returned.  The patient reports worsening of symptoms over time.  Patient  understands that the Parkinson's disease can carry with it its own pattern of spasm pain numbness and tingling.  There is cervical pathology possibly contributing.  We do however feel there is a discrete component of left carpal tunnel syndrome that would benefit from surgical decompression.  After full discussion regarding risks benefits and alternatives the patient elects to proceed forth with left carpal tunnel release.  Plan for wide-awake approach to anesthesia.  He takes no blood thinners.        Procedure:     - - -

## 2025-01-11 ENCOUNTER — HOSPITAL ENCOUNTER (EMERGENCY)
Facility: HOSPITAL | Age: 75
Discharge: HOME | End: 2025-01-11
Attending: STUDENT IN AN ORGANIZED HEALTH CARE EDUCATION/TRAINING PROGRAM
Payer: COMMERCIAL

## 2025-01-11 ENCOUNTER — APPOINTMENT (OUTPATIENT)
Dept: RADIOLOGY | Facility: HOSPITAL | Age: 75
End: 2025-01-11
Payer: COMMERCIAL

## 2025-01-11 ENCOUNTER — APPOINTMENT (OUTPATIENT)
Dept: CARDIOLOGY | Facility: HOSPITAL | Age: 75
End: 2025-01-11
Payer: COMMERCIAL

## 2025-01-11 VITALS
WEIGHT: 163 LBS | TEMPERATURE: 98.4 F | HEIGHT: 67 IN | SYSTOLIC BLOOD PRESSURE: 112 MMHG | DIASTOLIC BLOOD PRESSURE: 74 MMHG | BODY MASS INDEX: 25.58 KG/M2 | HEART RATE: 74 BPM | RESPIRATION RATE: 20 BRPM | OXYGEN SATURATION: 98 %

## 2025-01-11 DIAGNOSIS — S72.112A DISPLACED FRACTURE OF GREATER TROCHANTER OF LEFT FEMUR, INITIAL ENCOUNTER FOR CLOSED FRACTURE: Primary | ICD-10-CM

## 2025-01-11 LAB
ALBUMIN SERPL BCP-MCNC: 4.4 G/DL (ref 3.4–5)
ALP SERPL-CCNC: 77 U/L (ref 33–136)
ALT SERPL W P-5'-P-CCNC: 5 U/L (ref 10–52)
ANION GAP SERPL CALC-SCNC: 13 MMOL/L (ref 10–20)
AST SERPL W P-5'-P-CCNC: 22 U/L (ref 9–39)
ATRIAL RATE: 249 BPM
ATRIAL RATE: 340 BPM
BASOPHILS # BLD AUTO: 0.02 X10*3/UL (ref 0–0.1)
BASOPHILS NFR BLD AUTO: 0.3 %
BILIRUB SERPL-MCNC: 0.8 MG/DL (ref 0–1.2)
BUN SERPL-MCNC: 14 MG/DL (ref 6–23)
CALCIUM SERPL-MCNC: 8.9 MG/DL (ref 8.6–10.3)
CARDIAC TROPONIN I PNL SERPL HS: 5 NG/L (ref 0–20)
CARDIAC TROPONIN I PNL SERPL HS: 5 NG/L (ref 0–20)
CHLORIDE SERPL-SCNC: 99 MMOL/L (ref 98–107)
CO2 SERPL-SCNC: 29 MMOL/L (ref 21–32)
CREAT SERPL-MCNC: 0.76 MG/DL (ref 0.5–1.3)
EGFRCR SERPLBLD CKD-EPI 2021: >90 ML/MIN/1.73M*2
EOSINOPHIL # BLD AUTO: 0.11 X10*3/UL (ref 0–0.4)
EOSINOPHIL NFR BLD AUTO: 1.7 %
ERYTHROCYTE [DISTWIDTH] IN BLOOD BY AUTOMATED COUNT: 18.8 % (ref 11.5–14.5)
GLUCOSE SERPL-MCNC: 91 MG/DL (ref 74–99)
HCT VFR BLD AUTO: 42.9 % (ref 41–52)
HGB BLD-MCNC: 13.7 G/DL (ref 13.5–17.5)
IMM GRANULOCYTES # BLD AUTO: 0.03 X10*3/UL (ref 0–0.5)
IMM GRANULOCYTES NFR BLD AUTO: 0.5 % (ref 0–0.9)
LYMPHOCYTES # BLD AUTO: 1.21 X10*3/UL (ref 0.8–3)
LYMPHOCYTES NFR BLD AUTO: 18.6 %
MAGNESIUM SERPL-MCNC: 2.06 MG/DL (ref 1.6–2.4)
MCH RBC QN AUTO: 29.7 PG (ref 26–34)
MCHC RBC AUTO-ENTMCNC: 31.9 G/DL (ref 32–36)
MCV RBC AUTO: 93 FL (ref 80–100)
MONOCYTES # BLD AUTO: 0.67 X10*3/UL (ref 0.05–0.8)
MONOCYTES NFR BLD AUTO: 10.3 %
NEUTROPHILS # BLD AUTO: 4.45 X10*3/UL (ref 1.6–5.5)
NEUTROPHILS NFR BLD AUTO: 68.6 %
NRBC BLD-RTO: 0 /100 WBCS (ref 0–0)
PLATELET # BLD AUTO: 214 X10*3/UL (ref 150–450)
POTASSIUM SERPL-SCNC: 3.7 MMOL/L (ref 3.5–5.3)
PROT SERPL-MCNC: 7.1 G/DL (ref 6.4–8.2)
Q ONSET: 222 MS
Q ONSET: 224 MS
QRS COUNT: 12 BEATS
QRS COUNT: 13 BEATS
QRS DURATION: 80 MS
QRS DURATION: 80 MS
QT INTERVAL: 354 MS
QT INTERVAL: 394 MS
QTC CALCULATION(BAZETT): 405 MS
QTC CALCULATION(BAZETT): 428 MS
QTC FREDERICIA: 388 MS
QTC FREDERICIA: 416 MS
R AXIS: 63 DEGREES
R AXIS: 79 DEGREES
RBC # BLD AUTO: 4.61 X10*6/UL (ref 4.5–5.9)
SODIUM SERPL-SCNC: 137 MMOL/L (ref 136–145)
T AXIS: 175 DEGREES
T AXIS: 68 DEGREES
T OFFSET: 399 MS
T OFFSET: 421 MS
VENTRICULAR RATE: 71 BPM
VENTRICULAR RATE: 79 BPM
WBC # BLD AUTO: 6.5 X10*3/UL (ref 4.4–11.3)

## 2025-01-11 PROCEDURE — 93005 ELECTROCARDIOGRAM TRACING: CPT

## 2025-01-11 PROCEDURE — 70450 CT HEAD/BRAIN W/O DYE: CPT | Performed by: RADIOLOGY

## 2025-01-11 PROCEDURE — 71045 X-RAY EXAM CHEST 1 VIEW: CPT | Performed by: RADIOLOGY

## 2025-01-11 PROCEDURE — 96374 THER/PROPH/DIAG INJ IV PUSH: CPT | Mod: 59

## 2025-01-11 PROCEDURE — 73564 X-RAY EXAM KNEE 4 OR MORE: CPT | Mod: LT

## 2025-01-11 PROCEDURE — 83735 ASSAY OF MAGNESIUM: CPT | Performed by: STUDENT IN AN ORGANIZED HEALTH CARE EDUCATION/TRAINING PROGRAM

## 2025-01-11 PROCEDURE — 73502 X-RAY EXAM HIP UNI 2-3 VIEWS: CPT | Mod: LT

## 2025-01-11 PROCEDURE — 73700 CT LOWER EXTREMITY W/O DYE: CPT | Mod: LT

## 2025-01-11 PROCEDURE — 71045 X-RAY EXAM CHEST 1 VIEW: CPT

## 2025-01-11 PROCEDURE — 80053 COMPREHEN METABOLIC PANEL: CPT | Performed by: STUDENT IN AN ORGANIZED HEALTH CARE EDUCATION/TRAINING PROGRAM

## 2025-01-11 PROCEDURE — 73700 CT LOWER EXTREMITY W/O DYE: CPT | Mod: LEFT SIDE | Performed by: RADIOLOGY

## 2025-01-11 PROCEDURE — 2500000005 HC RX 250 GENERAL PHARMACY W/O HCPCS: Performed by: STUDENT IN AN ORGANIZED HEALTH CARE EDUCATION/TRAINING PROGRAM

## 2025-01-11 PROCEDURE — 2500000004 HC RX 250 GENERAL PHARMACY W/ HCPCS (ALT 636 FOR OP/ED): Performed by: STUDENT IN AN ORGANIZED HEALTH CARE EDUCATION/TRAINING PROGRAM

## 2025-01-11 PROCEDURE — 73564 X-RAY EXAM KNEE 4 OR MORE: CPT | Mod: LEFT SIDE | Performed by: RADIOLOGY

## 2025-01-11 PROCEDURE — 84484 ASSAY OF TROPONIN QUANT: CPT | Performed by: STUDENT IN AN ORGANIZED HEALTH CARE EDUCATION/TRAINING PROGRAM

## 2025-01-11 PROCEDURE — 36415 COLL VENOUS BLD VENIPUNCTURE: CPT | Performed by: STUDENT IN AN ORGANIZED HEALTH CARE EDUCATION/TRAINING PROGRAM

## 2025-01-11 PROCEDURE — 2500000001 HC RX 250 WO HCPCS SELF ADMINISTERED DRUGS (ALT 637 FOR MEDICARE OP): Performed by: EMERGENCY MEDICINE

## 2025-01-11 PROCEDURE — 99285 EMERGENCY DEPT VISIT HI MDM: CPT | Performed by: STUDENT IN AN ORGANIZED HEALTH CARE EDUCATION/TRAINING PROGRAM

## 2025-01-11 PROCEDURE — 99285 EMERGENCY DEPT VISIT HI MDM: CPT | Mod: 25 | Performed by: STUDENT IN AN ORGANIZED HEALTH CARE EDUCATION/TRAINING PROGRAM

## 2025-01-11 PROCEDURE — 72125 CT NECK SPINE W/O DYE: CPT | Performed by: RADIOLOGY

## 2025-01-11 PROCEDURE — 2500000004 HC RX 250 GENERAL PHARMACY W/ HCPCS (ALT 636 FOR OP/ED): Performed by: EMERGENCY MEDICINE

## 2025-01-11 PROCEDURE — 2500000001 HC RX 250 WO HCPCS SELF ADMINISTERED DRUGS (ALT 637 FOR MEDICARE OP): Performed by: STUDENT IN AN ORGANIZED HEALTH CARE EDUCATION/TRAINING PROGRAM

## 2025-01-11 PROCEDURE — 73502 X-RAY EXAM HIP UNI 2-3 VIEWS: CPT | Mod: LEFT SIDE | Performed by: RADIOLOGY

## 2025-01-11 PROCEDURE — 85025 COMPLETE CBC W/AUTO DIFF WBC: CPT | Performed by: STUDENT IN AN ORGANIZED HEALTH CARE EDUCATION/TRAINING PROGRAM

## 2025-01-11 PROCEDURE — 72125 CT NECK SPINE W/O DYE: CPT

## 2025-01-11 PROCEDURE — 70450 CT HEAD/BRAIN W/O DYE: CPT

## 2025-01-11 RX ORDER — OXYCODONE HYDROCHLORIDE 5 MG/1
5 TABLET ORAL ONCE
Status: COMPLETED | OUTPATIENT
Start: 2025-01-11 | End: 2025-01-11

## 2025-01-11 RX ORDER — KETOROLAC TROMETHAMINE 15 MG/ML
15 INJECTION, SOLUTION INTRAMUSCULAR; INTRAVENOUS ONCE
Status: COMPLETED | OUTPATIENT
Start: 2025-01-11 | End: 2025-01-11

## 2025-01-11 RX ORDER — LIDOCAINE 560 MG/1
1 PATCH PERCUTANEOUS; TOPICAL; TRANSDERMAL ONCE
Status: DISCONTINUED | OUTPATIENT
Start: 2025-01-11 | End: 2025-01-11 | Stop reason: HOSPADM

## 2025-01-11 RX ORDER — OXYCODONE HYDROCHLORIDE 5 MG/1
5 TABLET ORAL EVERY 8 HOURS PRN
Qty: 9 TABLET | Refills: 0 | Status: SHIPPED | OUTPATIENT
Start: 2025-01-11 | End: 2025-01-14

## 2025-01-11 RX ADMIN — OXYCODONE HYDROCHLORIDE 5 MG: 5 TABLET ORAL at 17:11

## 2025-01-11 RX ADMIN — OXYCODONE HYDROCHLORIDE 5 MG: 5 TABLET ORAL at 15:04

## 2025-01-11 RX ADMIN — LIDOCAINE 4% 1 PATCH: 40 PATCH TOPICAL at 15:05

## 2025-01-11 RX ADMIN — KETOROLAC TROMETHAMINE 15 MG: 15 INJECTION, SOLUTION INTRAMUSCULAR; INTRAVENOUS at 17:10

## 2025-01-11 RX ADMIN — KETOROLAC TROMETHAMINE 15 MG: 15 INJECTION, SOLUTION INTRAMUSCULAR; INTRAVENOUS at 12:45

## 2025-01-11 ASSESSMENT — PAIN - FUNCTIONAL ASSESSMENT
PAIN_FUNCTIONAL_ASSESSMENT: 0-10

## 2025-01-11 ASSESSMENT — PAIN SCALES - PAIN ASSESSMENT IN ADVANCED DEMENTIA (PAINAD): TOTALSCORE: MEDICATION (SEE MAR);REPOSITIONED

## 2025-01-11 ASSESSMENT — PAIN DESCRIPTION - LOCATION
LOCATION: HIP

## 2025-01-11 ASSESSMENT — PAIN DESCRIPTION - PAIN TYPE
TYPE: ACUTE PAIN

## 2025-01-11 ASSESSMENT — COLUMBIA-SUICIDE SEVERITY RATING SCALE - C-SSRS
6. HAVE YOU EVER DONE ANYTHING, STARTED TO DO ANYTHING, OR PREPARED TO DO ANYTHING TO END YOUR LIFE?: NO
1. IN THE PAST MONTH, HAVE YOU WISHED YOU WERE DEAD OR WISHED YOU COULD GO TO SLEEP AND NOT WAKE UP?: NO
2. HAVE YOU ACTUALLY HAD ANY THOUGHTS OF KILLING YOURSELF?: NO

## 2025-01-11 ASSESSMENT — PAIN DESCRIPTION - ORIENTATION
ORIENTATION: LEFT

## 2025-01-11 ASSESSMENT — PAIN DESCRIPTION - DESCRIPTORS
DESCRIPTORS: ACHING

## 2025-01-11 ASSESSMENT — PAIN SCALES - GENERAL
PAINLEVEL_OUTOF10: 7
PAINLEVEL_OUTOF10: 3
PAINLEVEL_OUTOF10: 10 - WORST POSSIBLE PAIN
PAINLEVEL_OUTOF10: 5 - MODERATE PAIN
PAINLEVEL_OUTOF10: 10 - WORST POSSIBLE PAIN
PAINLEVEL_OUTOF10: 7

## 2025-01-11 ASSESSMENT — LIFESTYLE VARIABLES
HAVE PEOPLE ANNOYED YOU BY CRITICIZING YOUR DRINKING: NO
EVER FELT BAD OR GUILTY ABOUT YOUR DRINKING: NO
TOTAL SCORE: 0
EVER HAD A DRINK FIRST THING IN THE MORNING TO STEADY YOUR NERVES TO GET RID OF A HANGOVER: NO
HAVE YOU EVER FELT YOU SHOULD CUT DOWN ON YOUR DRINKING: NO

## 2025-01-11 NOTE — ED PROVIDER NOTES
HPI   Chief Complaint   Patient presents with    Fall     Pt had a mechanical fall in the shower and is now c/o L knee and hip pain. Pt denies hitting head and denies LOC but is on eliquis.        74-year-old male with history of Alzheimer's disease presented Emergency Department for evaluation of a fall in the shower.  Reporting left hip pain.  Denies hitting his head but is otherwise an unreliable historian with no other family members or witnesses at bedside.  EMS did place in a cervical collar.  Patient otherwise states he is been in normal state of health and denies any chest pain, lightheadedness or dizziness prior to fall.  Per EMS report he did not have any known loss of consciousness and does not believe that he was down for any prolonged period of time.              Patient History   Past Medical History:   Diagnosis Date    Parkinson's disease (tremor, stiffness, slow motion, unstable posture) (Multi)     Personal history of other diseases of the circulatory system     History of hypertension    Personal history of transient ischemic attack (TIA), and cerebral infarction without residual deficits     History of stroke     Past Surgical History:   Procedure Laterality Date    OTHER SURGICAL HISTORY  05/26/2022    Knee replacement     No family history on file.  Social History     Tobacco Use    Smoking status: Former     Types: Cigarettes    Smokeless tobacco: Never   Vaping Use    Vaping status: Never Used   Substance Use Topics    Alcohol use: Never    Drug use: Never       Physical Exam   ED Triage Vitals [01/11/25 0951]   Temperature Heart Rate Respirations BP   36.9 °C (98.4 °F) 77 18 139/86      Pulse Ox Temp Source Heart Rate Source Patient Position   96 % Temporal Monitor Sitting      BP Location FiO2 (%)     Right arm --       Physical Exam  Vitals and nursing note reviewed.   Constitutional:       General: He is not in acute distress.     Appearance: He is well-developed.   HENT:      Head:  Normocephalic and atraumatic.      Comments: In cervical collar.  No raccoon eyes, Chapman sign, otorrhea.  No dental trauma or lip laceration.  No new soft hematoma.  No hemotympanum.  Midface is stable.     Nose: No congestion or rhinorrhea.   Eyes:      Conjunctiva/sclera: Conjunctivae normal.   Cardiovascular:      Rate and Rhythm: Normal rate and regular rhythm.      Pulses: Normal pulses.      Heart sounds: No murmur heard.  Pulmonary:      Effort: Pulmonary effort is normal. No respiratory distress.      Breath sounds: Normal breath sounds. No stridor. No wheezing, rhonchi or rales.   Abdominal:      General: Bowel sounds are normal. There is no distension.      Palpations: Abdomen is soft.      Tenderness: There is no abdominal tenderness. There is no guarding or rebound.   Musculoskeletal:         General: No swelling.      Cervical back: Neck supple. No rigidity.      Right lower leg: No edema.      Left lower leg: No edema.      Comments: No bony tenderness to the bilateral upper extremities.  Full strength of the bilateral upper extremities.  Patient with tenderness over the left hip and with range of motion of the left hip.  2+ dorsalis pedis and posterior tibial pulses in bilateral lower extremities.  Otherwise no bony tenderness to the bilateral lower extremities other than over the left knee with no joint effusion.   Skin:     General: Skin is warm and dry.      Capillary Refill: Capillary refill takes less than 2 seconds.      Findings: No rash.      Comments: No hematomas, lacerations or abrasions.   Neurological:      General: No focal deficit present.      Mental Status: He is alert. Mental status is at baseline. He is disoriented.      Cranial Nerves: No cranial nerve deficit.      Sensory: No sensory deficit.      Motor: No weakness.      Gait: Gait normal.   Psychiatric:         Mood and Affect: Mood normal.         Behavior: Behavior normal.           ED Course & MDM   ED Course as of 01/11/25  2058   Sat Jan 11, 2025   1057 EKG reviewed with what appears to be A-fib.  Substantial underlying artifact.  I requested nursing to obtain repeat at this time.  No obvious ischemic change. [TL]   1124 Left hip fracture appreciated on x-ray.  Pending CT imaging at this time and plan for admission. [TL]   1131 Call placed to orthopedics. [TL]   1151 IMPRESSION:  Moderate age related degenerative change as described without acute  findings or significant change from the prior exam.   [TL]   1151 IMPRESSION:  Moderate spondylosis similar to the prior exam, otherwise without  acute findings.   [TL]   1153 Orthopedics at the bedside. Requesting CT of hip at this time. [TL]   1335 Repeat call placed to orthopedics at this time for recommendations upon results of CT of the hip. [TL]   1336 CT hip left wo IV contrast [TL]   1344 Patient ok for 25% weight bearing with walker with ortho follow up in one week with orthopedics recommended by Dr. Viveros.  [TL]      ED Course User Index  [TL] Pepe Falcon DO         Diagnoses as of 01/11/25 2058   Displaced fracture of greater trochanter of left femur, initial encounter for closed fracture                 No data recorded     Finley Coma Scale Score: 15 (01/11/25 1000 : Sharon Sotelo RN)                           Medical Decision Making  74-year-old male on Plavix presenting for a fall.  I am concerned for possible left hip fracture based on significant pain with range of motion of left hip.  Denies hitting his head but already placed in cervical collar by EMS.  CT imaging of the head, cervical spine to be obtained as well as chest x-ray.  Given unreliable history and unclear etiology for the fall EKG, troponin, basic laboratory studies to be obtained.  Patient otherwise has no acute distress at this time.  Vital signs are reassuring.  Will remain in cervical collar until CT imaging is obtained    Dr. Viveros consulted and saw the patient in the ED. No emergent plan for surgery  and cleared by orthopedics for discharge.. See ED course for results of work up and disposition plan. Patient updated and is agreeable for discharge back to facility where they can help him with ambulation challenges and pain control. LLE remained NV stable on repeat assessments. Treated with oxycodone and toradol in the ED.     Procedure  Procedures     Pepe Falcon DO  01/11/25 2099

## 2025-01-11 NOTE — DISCHARGE INSTRUCTIONS
You have an avulsion fracture of the greater trochanter of your left hip.  This is nonoperative.  Please follow-up with orthopedics in 1 week.  You may walk with 25% weightbearing touch toe with a walker or remain in a wheelchair until then.  Please speak to your treating physician at your nursing facility regarding pain management until that time.

## 2025-01-13 LAB
ATRIAL RATE: 249 BPM
ATRIAL RATE: 340 BPM
Q ONSET: 222 MS
Q ONSET: 224 MS
QRS COUNT: 12 BEATS
QRS COUNT: 13 BEATS
QRS DURATION: 80 MS
QRS DURATION: 80 MS
QT INTERVAL: 354 MS
QT INTERVAL: 394 MS
QTC CALCULATION(BAZETT): 405 MS
QTC CALCULATION(BAZETT): 428 MS
QTC FREDERICIA: 388 MS
QTC FREDERICIA: 416 MS
R AXIS: 63 DEGREES
R AXIS: 79 DEGREES
T AXIS: 175 DEGREES
T AXIS: 68 DEGREES
T OFFSET: 399 MS
T OFFSET: 421 MS
VENTRICULAR RATE: 71 BPM
VENTRICULAR RATE: 79 BPM

## 2025-01-14 ENCOUNTER — OFFICE VISIT (OUTPATIENT)
Dept: ORTHOPEDIC SURGERY | Facility: CLINIC | Age: 75
End: 2025-01-14
Payer: COMMERCIAL

## 2025-01-14 VITALS — BODY MASS INDEX: 25.58 KG/M2 | WEIGHT: 163 LBS | HEIGHT: 67 IN

## 2025-01-14 DIAGNOSIS — M25.562 LEFT KNEE PAIN, UNSPECIFIED CHRONICITY: ICD-10-CM

## 2025-01-14 DIAGNOSIS — M25.552 PAIN IN LEFT HIP: ICD-10-CM

## 2025-01-14 DIAGNOSIS — S80.02XA CONTUSION OF LEFT KNEE, INITIAL ENCOUNTER: ICD-10-CM

## 2025-01-14 DIAGNOSIS — S72.115A: Primary | ICD-10-CM

## 2025-01-14 PROCEDURE — 3008F BODY MASS INDEX DOCD: CPT | Performed by: FAMILY MEDICINE

## 2025-01-14 PROCEDURE — 1036F TOBACCO NON-USER: CPT | Performed by: FAMILY MEDICINE

## 2025-01-14 PROCEDURE — L1812 KO ELASTIC W/JOINTS PRE OTS: HCPCS | Performed by: FAMILY MEDICINE

## 2025-01-14 PROCEDURE — 1159F MED LIST DOCD IN RCRD: CPT | Performed by: FAMILY MEDICINE

## 2025-01-14 PROCEDURE — 99214 OFFICE O/P EST MOD 30 MIN: CPT | Performed by: FAMILY MEDICINE

## 2025-01-14 PROCEDURE — 99214 OFFICE O/P EST MOD 30 MIN: CPT | Mod: 57,25 | Performed by: FAMILY MEDICINE

## 2025-01-14 ASSESSMENT — PATIENT HEALTH QUESTIONNAIRE - PHQ9
2. FEELING DOWN, DEPRESSED OR HOPELESS: NOT AT ALL
1. LITTLE INTEREST OR PLEASURE IN DOING THINGS: NOT AT ALL
SUM OF ALL RESPONSES TO PHQ9 QUESTIONS 1 AND 2: 0

## 2025-01-14 NOTE — PROGRESS NOTES
Acute Injury New Patient Visit    CC:   Chief Complaint   Patient presents with    Left Hip - Pain    Left Knee - Pain     Left hip and left knee pain , fell yesterday . Pain all over place, xrays today       HPI: Nguyễn is a 74 y.o.male who presents today with new complaints of following up left lower extremity pain status post fall.  Patient states he had slipped and fallen in the shower injuring his left hip and knee.  He states his left knee hurts all over and he points to the side of his left hip as area of discomfort.  Facility member here today at the bedside indicates that the patient has been up and walking around.        Review of Systems   GENERAL: Negative for malaise, significant weight loss, fever  MUSCULOSKELETAL: See HPI  NEURO: Negative for numbness / tingling     Past Medical History  Past Medical History:   Diagnosis Date    Parkinson's disease (tremor, stiffness, slow motion, unstable posture) (Multi)     Personal history of other diseases of the circulatory system     History of hypertension    Personal history of transient ischemic attack (TIA), and cerebral infarction without residual deficits     History of stroke       Medication review  Medication Documentation Review Audit       Reviewed by Mir Narayan MA (Medical Assistant) on 01/14/25 at 0842      Medication Order Taking? Sig Documenting Provider Last Dose Status   amLODIPine (Norvasc) 10 mg tablet [id number]  Take 1 tablet (10 mg) by mouth once daily. Historical Provider, MD  Active   aspirin 81 mg EC tablet [id number]  Take 1 tablet (81 mg) by mouth once daily. Historical Provider, MD  Active   atorvastatin (Lipitor) 80 mg tablet [id number]  Take 1 tablet (80 mg) by mouth once daily. Historical Provider, MD  Active   carbidopa-levodopa (Sinemet)  mg tablet [id number]  Take 1 tablet by mouth 3 times a day. Historical Provider, MD  Active   celecoxib (CeleBREX) 100 mg capsule [id number]  1 capsule (100 mg) once daily. Historical  Provider, MD  Active   citalopram (CeleXA) 20 mg tablet [id number]  1 tablet (20 mg). Historical Provider, MD  Active   clopidogrel (Plavix) 75 mg tablet [id number]  Take 1 tablet (75 mg) by mouth once daily. Historical Provider, MD  Active   divalproex (Depakote) 125 mg EC tablet [id number]  1 tablet (125 mg). 3 tabs BID Historical Provider, MD  Active   hydrocortisone 2.5 % cream [id number]   Historical Provider, MD  Active   levothyroxine (Synthroid, Levoxyl) 50 mcg tablet [id number]  Take by mouth. Historical Provider, MD  Active   magnesium hydroxide (Milk of Magnesia) 400 mg/5 mL suspension [id number]  Take by mouth once daily as needed for constipation. Historical Provider, MD  Active   melatonin 3 mg capsule [id number]  Take by mouth. Historical Provider, MD  Active   methylPREDNISolone (Medrol Dospak) 4 mg tablets [id number]  Use as directed by package instructions Cole C Budinsky, MD  Active   oxyCODONE (Roxicodone) 5 mg immediate release tablet [id number]  Take 1 tablet (5 mg) by mouth every 8 hours if needed for severe pain (7 - 10) for up to 3 days. Del Amaya DO  Active   pantoprazole (ProtoNix) 40 mg EC tablet [id number]  1 tablet (40 mg) once daily in the morning. Take before meals. Historical Provider, MD  Active   thiamine 100 mg tablet [id number]  Take 1 tablet (100 mg) by mouth. Historical Provider, MD  Active   traZODone (Desyrel) 100 mg tablet [id number]  1 tablet (100 mg) once daily at bedtime. Historical Provider, MD  Active                    Allergies  Allergies   Allergen Reactions    Ace Inhibitors Swelling and Other     04/30/2021 presented to ED with spontaneous angioedema to lips and tongue, taking Lisinopril at home. Lisinopril discontinued.    Morphine Other     Other reaction(s): Mental Status Change   Manic delirium.    Sulfamethoxazole-Trimethoprim Itching and Other     Pt developed truncal rash in 2015,he also developed sever eruptive skin rash in 2016 after he took it for the second  time with no mucosal involvement .    Benzodiazepines Other    Diphenhydramine Other    Fentanyl Other     Other reaction(s): Other: See Comments   headache    Haloperidol Other    Ketoconazole Other     Shampoo - rash and peeling of skin    Acetaminophen Rash and Other     Pt states he is not allergic 2/4/20    Amoxicillin Itching and Rash     Negative skin test and oral challenge amoxicillin 1/9/15. See allergy consult.   Took on 6/24/15 and had rash again.    Azithromycin Rash and Other     Rash and itching    Latex Rash    Oxybutynin Rash       Social History  Social History     Socioeconomic History    Marital status: Single     Spouse name: Not on file    Number of children: Not on file    Years of education: Not on file    Highest education level: Not on file   Occupational History    Not on file   Tobacco Use    Smoking status: Former     Types: Cigarettes    Smokeless tobacco: Never   Vaping Use    Vaping status: Never Used   Substance and Sexual Activity    Alcohol use: Never    Drug use: Never    Sexual activity: Not on file   Other Topics Concern    Not on file   Social History Narrative    Not on file     Social Drivers of Health     Financial Resource Strain: Not on file   Food Insecurity: Not on file   Transportation Needs: Not on file   Physical Activity: Not on file   Stress: Not on file   Social Connections: Not on file   Intimate Partner Violence: Not on file   Housing Stability: Not on file       Surgical History  Past Surgical History:   Procedure Laterality Date    OTHER SURGICAL HISTORY  05/26/2022    Knee replacement       Physical Exam:  GENERAL:  Patient is awake, alert, and oriented to person place and time.  Patient appears well nourished and well kept.  Affect Calm, Not Acutely Distressed.  HEENT:  Normocephalic, Atraumatic, EOMI  CARDIOVASCULAR:  Hemodynamically stable.  RESPIRATORY:  Normal respirations with unlabored breathing.  NEURO: Gross sensation intact to the lower extremities  bilaterally.  Extremity: Left knee demonstrates warm pink well-perfused intact extensor mechanism noted with some stiffness calf soft nontender no open cuts wounds or sores.  No obvious crepitus no laxity with valgus or varus stress.  Left hip left hip demonstrates tenderness palpation over the lateral side of the hip at the level of the greater trochanter and more proximally.  There is no thigh pain.  Quad muscle is mildly uncomfortable without any obvious palpable defect or deformity.  Symptomatic internal and external rotation about the hip as well as AB and adduction.      Diagnostics: Previous CT scan and x-ray images from the hospital reviewed consistent with a comminuted minimally displaced superolateral greater trochanter fracture, no presence for hardware fracture or malfunction to the left total knee replacement        Procedure: None  Procedures    Assessment:   Problem List Items Addressed This Visit    None  Visit Diagnoses       Nondisp fx of greater trochanter of left femur, init    -  Primary    Relevant Medications    menthol (Biofreeze) 4 % gel gel    Pain in left hip        Relevant Orders    XR knee left 3 views    XR hip left with pelvis when performed 2 or 3 views    Left knee pain, unspecified chronicity        Relevant Orders    XR knee left 3 views    XR hip left with pelvis when performed 2 or 3 views    Contusion of left knee, initial encounter        Relevant Medications    menthol (Biofreeze) 4 % gel gel    Other Relevant Orders    Knee Brace, Hinged             Plan: Discussed the nonoperative nature of this injury with the patient and the facility member at the bedside.  Will allow him to weight-bear as tolerated either with the use of a rollator walker or similar.  He also has access to a wheelchair if he needs it.  We will provide him with a left simple hinged knee brace for the contusion/strain of the left knee.  He will be allowed to weight-bear as tolerated.  We will hold off on  physical therapy for now.  We will provide him a prescription for topical muscle rub cream.  Pain medications as per facility.  Additionally continued patient will continue with his vitamin D supplementation as well.  Recommended icing 15 to 20 minutes at a time every 2-3 hours as needed for pain control.  And we will see him back in 6 weeks for repeat evaluation.  Repeat x-rays 2 views left knee and AP pelvis and 2 views left hip.  Patient call or return sooner with any worsening or persistent issues.  Hopefully and is not she transition to physical therapy at that time.  A copy of this note was provided with the facility to assist with transitions of care.  Orders Placed This Encounter    Knee Brace, Hinged    XR knee left 3 views    XR hip left with pelvis when performed 2 or 3 views    menthol (Biofreeze) 4 % gel gel      At the conclusion of the visit there were no further questions by the patient/family regarding their plan of care.  Patient was instructed to call or return with any issues, questions, or concerns regarding their injury and/or treatment plan described above.     01/14/25 at 8:53 AM - Cole C Budinsky, MD    Office: [phone]    This note was prepared using voice recognition software.  The details of this note are correct and have been reviewed, and corrected to the best of my ability.  Some grammatical errors may persist related to the Dragon software.

## 2025-01-24 ENCOUNTER — APPOINTMENT (OUTPATIENT)
Dept: INTEGRATIVE MEDICINE | Facility: CLINIC | Age: 75
End: 2025-01-24
Payer: MEDICARE

## 2025-01-24 DIAGNOSIS — M54.17 LUMBOSACRAL RADICULOPATHY: ICD-10-CM

## 2025-01-24 DIAGNOSIS — M99.00 HEAD REGION SOMATIC DYSFUNCTION: ICD-10-CM

## 2025-01-24 DIAGNOSIS — M99.02 SOMATIC DYSFUNCTION OF THORACIC REGION: Primary | ICD-10-CM

## 2025-01-24 DIAGNOSIS — M54.2 NECK PAIN: ICD-10-CM

## 2025-01-24 DIAGNOSIS — M47.816 LUMBAR SPONDYLOSIS: ICD-10-CM

## 2025-01-24 DIAGNOSIS — M47.812 CERVICAL SPONDYLOSIS: ICD-10-CM

## 2025-01-24 DIAGNOSIS — M99.01 CERVICAL SEGMENT DYSFUNCTION: ICD-10-CM

## 2025-01-24 PROCEDURE — 99203 OFFICE O/P NEW LOW 30 MIN: CPT | Performed by: CHIROPRACTOR

## 2025-01-24 PROCEDURE — 98941 CHIROPRACT MANJ 3-4 REGIONS: CPT | Performed by: CHIROPRACTOR

## 2025-01-24 ASSESSMENT — ENCOUNTER SYMPTOMS
NAUSEA: 0
AGITATION: 0
DIFFICULTY URINATING: 0
COLOR CHANGE: 0
TREMORS: 1
DYSURIA: 0
FEVER: 0
BACK PAIN: 1
WEAKNESS: 1
DIARRHEA: 0
VOMITING: 0
SHORTNESS OF BREATH: 0
MYALGIAS: 1
NUMBNESS: 1
NECK PAIN: 1
DIZZINESS: 0
NECK STIFFNESS: 1

## 2025-01-24 NOTE — PROGRESS NOTES
Assessment/Plan   The patient's spinal pain is consistent with spondylosis as well as persistent spine pain syndrome possible degenerative cervical myelopathy and chronic lumbar radiculopathy.  His symptoms are complicated by deconditioning as well as several comorbidities including Alzheimer's disease hypothyroidism prior stroke lupus and rheumatoid arthritis.  Considering contraindications to high velocity low amplitude manipulation we will focus on low force mobilizations through the cervical and thoracic region without any manipulations targeting the lumbar spine given the fusion present from L1-L5 in the SI joints.  Recommended a brief trial of care to determine if this will be beneficial otherwise I encouraged the patient to participate in exercise classes offered at his nursing home as well as continue to receive massage therapy sessions that he has benefited from.  The patient has difficulty lying flat on the table therefore we will commence treatment with him in the seated position which she tolerated well at our first visit    CT cervical spine wo IV contrast  Result Date: 1/11/2025  Moderate spondylosis similar to the prior exam, otherwise without acute findings.       XR cervical spine complete 4-5 views  Result Date: 11/18/2024  Four views cervical spine demonstrate loss of the cervical lordotic curve with some mild reversal with apex at the C2-C3 level. Significant ventral osteophytes seen throughout the C2 through C7 level. Significant degenerative disc disease also noted throughout. No obvious presence for fracture or listhesis.        Visits this year: 1    Subjective     HPI - Spinal pain 1/24/25 -patient reports chronic spinal pain including the neck middle and lower back endorsing associated discomfort in both lower extremities predominantly the left anterior hip and thigh and relates that to hip fracture.  Notes that several years ago he had lumbar spine fusion does not recall the exact date  although there is imaging in the chart showing that the fusion from L1-L5 including the SI joints was present at least as early as 2016.  Notes that several years ago he was seeing a chiropractor is not exactly clear when but he notes that he had some benefit from doing this.  Notes that he feels weak in the lower extremities and denies having any issues with incontinence although he does endorse a weak stream.  Lives in nursing home uses a walker to help ambulate and presents with an ST NA at our appointment although she went back to the waiting room in the middle of her history.  He notes that his legs are constantly numb and he does get some relief with massage at the nursing home.  He also reports falling in the shower 2 weeks ago.  The middle back pain has been worse over the past month. Pain is rated 10/10 NRS.    Review of Systems   Constitutional:  Negative for fever.   Eyes:  Negative for visual disturbance.   Respiratory:  Negative for shortness of breath.    Cardiovascular:  Negative for chest pain.   Gastrointestinal:  Negative for diarrhea, nausea and vomiting.   Genitourinary:  Negative for difficulty urinating and dysuria.   Musculoskeletal:  Positive for back pain, gait problem, myalgias, neck pain and neck stiffness.   Skin:  Negative for color change.   Neurological:  Positive for tremors (BL upper & lower extremities, tongue), weakness and numbness. Negative for dizziness.   Psychiatric/Behavioral:  Negative for agitation.    All other systems reviewed and are negative.    Objective   Examination findings (e.g., palpation & ROM): Decreased C/S and L/S ROM with pain, hypertonic and tender cervical and lumbar erectors & TS erectors    Segmental joint dysfunction was identified in the following areas using motion palpation and/or pain provocation assessment:  Cervical: 0, 7  Thoracic: 5-8  Lumbopelvic:      Physical Exam  Neurological:      Mental Status: He is alert.      Sensory: Sensation is  intact.      Deep Tendon Reflexes:      Reflex Scores:       Tricep reflexes are 3+ on the right side and 3+ on the left side.       Bicep reflexes are 3+ on the right side and 3+ on the left side.       Brachioradialis reflexes are 3+ on the right side and 3+ on the left side.       Patellar reflexes are 1+ on the right side and 1+ on the left side.       Achilles reflexes are 2+ on the right side and 2+ on the left side.     Comments: +ve Paece's L  UE str wnl  LE str diffusely 3/5  1/24/25       Plan   Today's treatment:  SMT to regions of segmental dysfunction identified on exam, using age-appropriate force, and manual mobilization in CS and TS and instrument assisted manipulation also in TS with patient seated  STM to patient tolerance to hypertonic paraspinal muscles in LS  Patient noted improved mobility and reduced pain post-treatment    Treatment Plan:   The patient and I discussed the risks and benefits of chiropractic care. Based on the patient's subjective complaints along with the examination findings, it is advised that a course of chiropractic treatment be initiated. The patient provided consent for care. The patient tolerated today's treatment with little or no additional discomfort and was instructed to contact the office for questions or concerns. Will see patient once per week then every 2 weeks when symptoms become mild/manageable, further spaced apart contingent upon improvement.     This chart note was generated using dictation software, and as such, there may be typographical errors present. Abbreviations: Cervical spine (CS), cervical-thoracic (CT), Dry needling (DN), Flexion adduction internal rotation (FAIR), high velocity, low amplitude (HVLA), Lumbar spine (LS), Soft tissue manipulation (STM), spinal manipulative therapy (SMT), Straight leg raise (SLR), Thoracic spine (TS).

## 2025-01-31 ENCOUNTER — APPOINTMENT (OUTPATIENT)
Dept: ORTHOPEDIC SURGERY | Facility: CLINIC | Age: 75
End: 2025-01-31
Payer: COMMERCIAL

## 2025-02-06 ENCOUNTER — LAB REQUISITION (OUTPATIENT)
Dept: LAB | Facility: HOSPITAL | Age: 75
End: 2025-02-06
Payer: COMMERCIAL

## 2025-02-06 DIAGNOSIS — R79.89 OTHER SPECIFIED ABNORMAL FINDINGS OF BLOOD CHEMISTRY: ICD-10-CM

## 2025-02-06 LAB — AMMONIA PLAS-SCNC: 33 UMOL/L (ref 16–53)

## 2025-02-06 PROCEDURE — 82140 ASSAY OF AMMONIA: CPT

## 2025-02-20 ASSESSMENT — ENCOUNTER SYMPTOMS
DIFFICULTY URINATING: 0
VOMITING: 0
NAUSEA: 0
DYSURIA: 0
BACK PAIN: 1
DIZZINESS: 0
NECK STIFFNESS: 1
AGITATION: 0
DIARRHEA: 0
TREMORS: 1
MYALGIAS: 1
FEVER: 0
NECK PAIN: 1
SHORTNESS OF BREATH: 0
NUMBNESS: 1
WEAKNESS: 1
COLOR CHANGE: 0

## 2025-02-20 NOTE — PROGRESS NOTES
Assessment/Plan   The patient's spinal pain is consistent with spondylosis as well as persistent spine pain syndrome possible degenerative cervical myelopathy and chronic lumbar radiculopathy.  His symptoms are complicated by deconditioning as well as several comorbidities including Alzheimer's disease hypothyroidism prior stroke, lupus and rheumatoid arthritis.  Considering contraindications to high velocity low amplitude manipulation we will focus on low force mobilizations through the cervical and thoracic region without any manipulations targeting the lumbar spine given the fusion present from L1-L5 in the SI joints. Recommended a brief trial of care to determine if this will be beneficial otherwise I encouraged the patient to participate in exercise classes offered at his nursing home as well as continue to receive massage therapy sessions that he has benefited from.  The patient has difficulty lying flat on the table therefore we will commence treatment with him in the seated position which he tolerated well at our first visit    CT cervical spine wo IV contrast  Result Date: 1/11/2025  Moderate spondylosis similar to the prior exam, otherwise without acute findings.       XR cervical spine complete 4-5 views  Result Date: 11/18/2024  Four views cervical spine demonstrate loss of the cervical lordotic curve with some mild reversal with apex at the C2-C3 level. Significant ventral osteophytes seen throughout the C2 through C7 level. Significant degenerative disc disease also noted throughout. No obvious presence for fracture or listhesis.        Visits this year: 2    Subjective   Patient reports 10 out of 10 neck pain and stiffness.  Did get some relief from her last visit difficult to say how much or how long it lasted.  He presents today with someone from [facility] who help transport him to the waiting room and he is in a wheelchair today to help get around.  Comes to the treatment office  office by himself for treatment however.  He is able to describe his symptoms fairly well noting stiffness and tightness in the upper neck limited neck range of motion.  No headache or radiating symptoms in the extremities.  He endorses ongoing tremor which is not well-controlled via medication.    HPI - Spinal pain 1/24/25 -patient reports chronic spinal pain including the neck middle and lower back endorsing associated discomfort in both lower extremities predominantly the left anterior hip and thigh and relates that to hip fracture.  Notes that several years ago he had lumbar spine fusion does not recall the exact date although there is imaging in the chart showing that the fusion from L1-L5 including the SI joints was present at least as early as 2016.  Notes that several years ago he was seeing a chiropractor is not exactly clear when but he notes that he had some benefit from doing this.  Notes that he feels weak in the lower extremities and denies having any issues with incontinence although he does endorse a weak stream.  Lives in nursing home uses a walker to help ambulate and presents with an ST NA at our appointment although she went back to the waiting room in the middle of her history.  He notes that his legs are constantly numb and he does get some relief with massage at the nursing home.  He also reports falling in the shower 2 weeks ago.  The middle back pain has been worse over the past month. Pain is rated 10/10 NRS.    Review of Systems   Constitutional:  Negative for fever.   Eyes:  Negative for visual disturbance.   Respiratory:  Negative for shortness of breath.    Cardiovascular:  Negative for chest pain.   Gastrointestinal:  Negative for diarrhea, nausea and vomiting.   Genitourinary:  Negative for difficulty urinating and dysuria.   Musculoskeletal:  Positive for back pain, gait problem, myalgias, neck pain and neck stiffness.   Skin:  Negative for color change.   Neurological:  Positive for  tremors (BL upper & lower extremities, tongue), weakness and numbness. Negative for dizziness.   Psychiatric/Behavioral:  Negative for agitation.    All other systems reviewed and are negative.    Objective   Examination findings (e.g., palpation & ROM): Decreased C/S and L/S ROM with pain, hypertonic and tender cervical and lumbar erectors & TS erectors, suboccipitals    Segmental joint dysfunction was identified in the following areas using motion palpation and/or pain provocation assessment:  Cervical: 0, 7  Thoracic: 5-8  Lumbopelvic:      Physical Exam  Neurological:      Mental Status: He is alert.      Sensory: Sensation is intact.      Deep Tendon Reflexes:      Reflex Scores:       Tricep reflexes are 3+ on the right side and 3+ on the left side.       Bicep reflexes are 3+ on the right side and 3+ on the left side.       Brachioradialis reflexes are 3+ on the right side and 3+ on the left side.       Patellar reflexes are 1+ on the right side and 1+ on the left side.       Achilles reflexes are 2+ on the right side and 2+ on the left side.     Comments: +ve Peace's L  UE str wnl  LE str diffusely 3/5  1/24/25       Plan   Today's treatment:  SMT to regions of segmental dysfunction identified on exam, using age-appropriate force, and manual mobilization in CS and TS and instrument assisted manipulation also in TS with patient seated  STM to patient tolerance to hypertonic paraspinal muscles in LS  Patient noted improved mobility and reduced pain post-treatment    Treatment Plan:   The patient and I discussed the risks and benefits of chiropractic care. Based on the patient's subjective complaints along with the examination findings, it is advised that a course of chiropractic treatment be initiated. The patient provided consent for care. The patient tolerated today's treatment with little or no additional discomfort and was instructed to contact the office for questions or concerns. Will see patient once  per week then every 2 weeks when symptoms become mild/manageable, further spaced apart contingent upon improvement.     This chart note was generated using dictation software, and as such, there may be typographical errors present. Abbreviations: Cervical spine (CS), cervical-thoracic (CT), Dry needling (DN), Flexion adduction internal rotation (FAIR), high velocity, low amplitude (HVLA), Lumbar spine (LS), Soft tissue manipulation (STM), spinal manipulative therapy (SMT), Straight leg raise (SLR), Thoracic spine (TS).

## 2025-02-21 ENCOUNTER — APPOINTMENT (OUTPATIENT)
Dept: INTEGRATIVE MEDICINE | Facility: CLINIC | Age: 75
End: 2025-02-21
Payer: COMMERCIAL

## 2025-02-21 DIAGNOSIS — M47.816 LUMBAR SPONDYLOSIS: ICD-10-CM

## 2025-02-21 DIAGNOSIS — M99.00 HEAD REGION SOMATIC DYSFUNCTION: ICD-10-CM

## 2025-02-21 DIAGNOSIS — M99.02 SOMATIC DYSFUNCTION OF THORACIC REGION: Primary | ICD-10-CM

## 2025-02-21 DIAGNOSIS — M99.01 CERVICAL SEGMENT DYSFUNCTION: ICD-10-CM

## 2025-02-21 DIAGNOSIS — M54.2 NECK PAIN: ICD-10-CM

## 2025-02-21 DIAGNOSIS — M47.812 CERVICAL SPONDYLOSIS: ICD-10-CM

## 2025-02-21 DIAGNOSIS — M54.17 LUMBOSACRAL RADICULOPATHY: ICD-10-CM

## 2025-02-21 PROCEDURE — 98941 CHIROPRACT MANJ 3-4 REGIONS: CPT | Performed by: CHIROPRACTOR

## 2025-02-25 ENCOUNTER — HOSPITAL ENCOUNTER (OUTPATIENT)
Dept: RADIOLOGY | Facility: CLINIC | Age: 75
Discharge: HOME | End: 2025-02-25
Payer: COMMERCIAL

## 2025-02-25 ENCOUNTER — OFFICE VISIT (OUTPATIENT)
Dept: ORTHOPEDIC SURGERY | Facility: CLINIC | Age: 75
End: 2025-02-25
Payer: COMMERCIAL

## 2025-02-25 DIAGNOSIS — M54.2 NECK PAIN: Primary | ICD-10-CM

## 2025-02-25 DIAGNOSIS — M54.12 CERVICAL RADICULOPATHY: ICD-10-CM

## 2025-02-25 DIAGNOSIS — M54.2 NECK PAIN: ICD-10-CM

## 2025-02-25 DIAGNOSIS — T84.038A ASEPTIC LOOSENING OF PROSTHETIC KNEE, INITIAL ENCOUNTER (CMS-HCC): ICD-10-CM

## 2025-02-25 DIAGNOSIS — S72.115A: ICD-10-CM

## 2025-02-25 DIAGNOSIS — M50.30 DDD (DEGENERATIVE DISC DISEASE), CERVICAL: ICD-10-CM

## 2025-02-25 DIAGNOSIS — Z96.659 ASEPTIC LOOSENING OF PROSTHETIC KNEE, INITIAL ENCOUNTER (CMS-HCC): ICD-10-CM

## 2025-02-25 DIAGNOSIS — M25.562 LEFT KNEE PAIN, UNSPECIFIED CHRONICITY: ICD-10-CM

## 2025-02-25 PROCEDURE — 1036F TOBACCO NON-USER: CPT | Performed by: FAMILY MEDICINE

## 2025-02-25 PROCEDURE — 99214 OFFICE O/P EST MOD 30 MIN: CPT | Performed by: FAMILY MEDICINE

## 2025-02-25 PROCEDURE — 73502 X-RAY EXAM HIP UNI 2-3 VIEWS: CPT | Mod: LEFT SIDE | Performed by: FAMILY MEDICINE

## 2025-02-25 PROCEDURE — 1159F MED LIST DOCD IN RCRD: CPT | Performed by: FAMILY MEDICINE

## 2025-02-25 PROCEDURE — 73560 X-RAY EXAM OF KNEE 1 OR 2: CPT | Mod: LEFT SIDE | Performed by: FAMILY MEDICINE

## 2025-02-25 PROCEDURE — 72050 X-RAY EXAM NECK SPINE 4/5VWS: CPT

## 2025-02-25 PROCEDURE — 1160F RVW MEDS BY RX/DR IN RCRD: CPT | Performed by: FAMILY MEDICINE

## 2025-02-25 PROCEDURE — 72050 X-RAY EXAM NECK SPINE 4/5VWS: CPT | Performed by: FAMILY MEDICINE

## 2025-02-25 PROCEDURE — 73502 X-RAY EXAM HIP UNI 2-3 VIEWS: CPT | Mod: LT

## 2025-02-25 PROCEDURE — 73560 X-RAY EXAM OF KNEE 1 OR 2: CPT | Mod: LT

## 2025-02-25 RX ORDER — TIZANIDINE 4 MG/1
2 TABLET ORAL NIGHTLY PRN
Qty: 7 TABLET | Refills: 0 | Status: SHIPPED | OUTPATIENT
Start: 2025-02-25 | End: 2025-03-11

## 2025-02-25 RX ORDER — TRAMADOL HYDROCHLORIDE 50 MG/1
50 TABLET ORAL EVERY 8 HOURS PRN
Qty: 10 TABLET | Refills: 0 | Status: SHIPPED | OUTPATIENT
Start: 2025-02-25 | End: 2025-03-02

## 2025-02-25 NOTE — PROGRESS NOTES
Established Patient Follow-Up Visit    CC:   Chief Complaint   Patient presents with    Left Hip - Follow-up    Left Knee - Follow-up       HPI:  Nguyễn is a 74 y.o. male returns here today for follow-up visit regarding: Follow-up of his left hip avulsion fracture left knee pain contusion status post left knee arthroplasty, and worsening upper extremity pain secondary to cervical radiculopathy.  He states he is in quite a bit of pain and discomfort.  He asked me if there is anything I can do to assist with his discomfort today.  He is agreeable to any type of procedure medication so that he can just be rid of this.  He seems very downtrodden and irritated with the discomfort.          REVIEW OF SYSTEMS:  GENERAL: Negative for malaise, significant weight loss, fever  MUSCULOSKELETAL: See HPI  NEURO: Positive for numbness / tingling       PHYSICAL EXAM:  -Neuro: The bilateral upper and lower extremities were examined and demonstrated intact sensation medially and laterally.  -Extremity: Bilateral upper extremities demonstrate reduced range of motion there is obvious tremors noted he has tenderness to palpation all about the entirety of the left upper extremity worse than the right.  Limited range of motion about the neck.  He is only able to lie down on the exam table due to discomfort here today.    Left hip with a negative logroll but pain to the greater trochanteric region no obvious crepitus he has a full intact extensor mechanism.  He can extend the left knee pretty well but lacks about 5 degrees of terminal extension he can flex to about 90.  He has tenderness palpation medially laterally and superior laterally.  There is some soft tissue bruising noted there is some decreased musculature noted about the distal quad with no obvious extensor mechanism disruption.    IMAGING: X-rays today demonstrate stable appearing left greater trochanter bursal avulsion fracture, stable cervical degenerative changes and  arthrosis, left knee demonstrates concern for increased lucencies about the hardware without obvious presence of fracture or dislocation.      PROCEDURE: None today  Procedures     ASSESSMENT:   Follow-up visit for:  Problem List Items Addressed This Visit    None  Visit Diagnoses       Neck pain    -  Primary    Relevant Medications    traMADol (Ultram) 50 mg tablet    tiZANidine (Zanaflex) 4 mg tablet    Other Relevant Orders    XR cervical spine complete 4-5 views    NM bone 3 phase    Referral to Pain Management    MR cervical spine wo IV contrast    Nondisp fx of greater trochanter of left femur, init        Relevant Medications    traMADol (Ultram) 50 mg tablet    tiZANidine (Zanaflex) 4 mg tablet    Other Relevant Orders    XR hip left with pelvis when performed 2 or 3 views    XR cervical spine complete 4-5 views    NM bone 3 phase    Referral to Pain Management    MR cervical spine wo IV contrast    Left knee pain, unspecified chronicity        Relevant Medications    traMADol (Ultram) 50 mg tablet    tiZANidine (Zanaflex) 4 mg tablet    Other Relevant Orders    XR knee left 1-2 views    XR cervical spine complete 4-5 views    NM bone 3 phase    Referral to Pain Management    MR cervical spine wo IV contrast    Aseptic loosening of prosthetic knee, initial encounter ([facility])        Relevant Medications    traMADol (Ultram) 50 mg tablet    tiZANidine (Zanaflex) 4 mg tablet    Other Relevant Orders    NM bone 3 phase    Referral to Pain Management    MR cervical spine wo IV contrast    Cervical radiculopathy        Relevant Medications    traMADol (Ultram) 50 mg tablet    tiZANidine (Zanaflex) 4 mg tablet    Other Relevant Orders    NM bone 3 phase    Referral to Pain Management    MR cervical spine wo IV contrast    DDD (degenerative disc disease), cervical        Relevant Medications    traMADol (Ultram) 50 mg tablet    tiZANidine (Zanaflex) 4 mg tablet    Other Relevant Orders    NM bone 3 phase     Referral to Pain Management    MR cervical spine wo IV contrast             PLAN: At this time we will offer the patient continued use of physical therapy for range of motion and strength recovery.  Regarding the left knee will obtain a bone scan to further rule out aseptic loosening of the hardware.  Also suggested recommend that they continue physical therapy for the neck upper back and upper extremities as tolerated.  Egarding his chronic upper extremity and neck pain we will obtain an MRI of the cervical spine and place a pain management referral for potential cervical injections.  Would also request that his facility address his ongoing pain management needs until able to be placed into pain management.  He seems to be very depressed and is frustrated and upset with the level of pain and indicates to me that he wanted all to just go away.  It sounds as if he is trying to tell me he is feels like he is nearing the end of the rope with this.  At this time would I would suggest the primary medical providers consider tramadol medication for symptomatic pain relief.  I will provide him a one-time 1 week prescription of tramadol 50 mg to get started.  I would also suggest mental health/counseling to further discuss the patient's increasing frustrations given his chronic pain for his overall wellbeing.    Follow-up:   -I will have him follow-up with one of my knee specialists, Dr. Abelardo Cornejo going forward after the bone scan has been completed.  He should utilize his hinged knee brace and light toe-touch weightbearing as tolerated or pain allows versus full rest in the wheelchair.  -Pain management referral placed, in conjunction with orders for cervical MRI for his worsening chronic upper extremity radicular pain.  -I will plan to see him back in 6 weeks for repeat evaluation I will repeat x-rays of his hip at that time for further evaluation of his greater trochanter avulsion injury.    Orders Placed This  Encounter    XR knee left 1-2 views    XR hip left with pelvis when performed 2 or 3 views    XR cervical spine complete 4-5 views    NM bone 3 phase    MR cervical spine wo IV contrast    Referral to Pain Management    traMADol (Ultram) 50 mg tablet    tiZANidine (Zanaflex) 4 mg tablet           At the conclusion of the visit there were no further questions by the patient/family regarding their plan of care.  Patient was instructed to call or return with any issues, questions, or concerns regarding their injury and/or treatment plan described above.     02/25/25 at 11:18 AM - Cole C Budinsky, MD    Office: [phone]    This note was prepared using voice recognition software.  The details of this note are correct and have been reviewed, and corrected to the best of my ability.  Some grammatical errors may persist related to the Dragon software.

## 2025-02-28 ENCOUNTER — APPOINTMENT (OUTPATIENT)
Dept: INTEGRATIVE MEDICINE | Facility: CLINIC | Age: 75
End: 2025-02-28
Payer: COMMERCIAL

## 2025-03-06 ENCOUNTER — OFFICE VISIT (OUTPATIENT)
Dept: ORTHOPEDIC SURGERY | Facility: CLINIC | Age: 75
End: 2025-03-06
Payer: COMMERCIAL

## 2025-03-06 DIAGNOSIS — S20.212A RIB CONTUSION, LEFT, INITIAL ENCOUNTER: Primary | ICD-10-CM

## 2025-03-06 PROCEDURE — 99214 OFFICE O/P EST MOD 30 MIN: CPT | Performed by: FAMILY MEDICINE

## 2025-03-06 PROCEDURE — 1036F TOBACCO NON-USER: CPT | Performed by: FAMILY MEDICINE

## 2025-03-06 PROCEDURE — 1159F MED LIST DOCD IN RCRD: CPT | Performed by: FAMILY MEDICINE

## 2025-03-06 PROCEDURE — 1160F RVW MEDS BY RX/DR IN RCRD: CPT | Performed by: FAMILY MEDICINE

## 2025-03-06 RX ORDER — LIDOCAINE 50 MG/G
1 PATCH TOPICAL DAILY
Qty: 30 PATCH | Refills: 0 | Status: SHIPPED | OUTPATIENT
Start: 2025-03-06 | End: 2025-04-05

## 2025-03-06 NOTE — PROGRESS NOTES
Established Patient Follow-Up Visit    CC:   Chief Complaint   Patient presents with    Left Hip - Pain       HPI:  Nguyễn is a 74 y.o. male returns here today for follow-up visit regarding: Presents here today to follow-up his left hip pain as well as pain to the left side of his ribs.  He tells me he is here today because of his left chest wall rib.  He states there was no injury slip trip or fall.  He is not sure if he pulled it.  Previously been treating him for his avulsion fracture off the greater trochanter of the left hip.  He has a pending pain management referral pending cervical spine MRI he is pending a bone scan for aseptic loosening of his hardware.  He states that he is frustrated with the facility as he feels like there is too much of a delay for his medications.  Negative           REVIEW OF SYSTEMS:  GENERAL: Negative for malaise, significant weight loss, fever  MUSCULOSKELETAL: See HPI  NEURO: Negative for numbness / tingling       PHYSICAL EXAM:  -Neuro: Gross sensation intact to the upper extremities bilaterally.  -Extremity: Obvious tremors noted to the bilateral upper extremities  strength is intact he has tenderness palpation over the left pec muscle which is intact with no deficit he has no crepitus about the rib cage there is soft tissue discomfort throughout the chest wall laterally.  He is able to ambulate around the room with minimal antalgic gait.  He has no tenderness palpation over the lateral or anterior aspect of the left hip.    IMAGING: No new imaging today      PROCEDURE: None  Procedures     ASSESSMENT:   Follow-up visit for:  Problem List Items Addressed This Visit    None  Visit Diagnoses       Rib contusion, left, initial encounter    -  Primary    Relevant Medications    lidocaine (Lidoderm) 5 % patch             PLAN: At this time we will offer the patient lidocaine patches for the left chest wall.  Continue with pain medications and muscle relaxers as per his  facility.  He may continue to weight-bear as tolerated with or without the use of walker going forward.  Would recommend that he utilize his hinged knee brace for support until we are able to get the bone scan and have him follow-up with one of our knee surgeons.  At this time I am happy to see him back as needed down the road for his left hip.  We can also consider potential steroid shots or injections for any deep inguinal groin pain for arthritis.  Does not really seem like there is any arthritis pain however.  Orders Placed This Encounter    lidocaine (Lidoderm) 5 % patch           At the conclusion of the visit there were no further questions by the patient/family regarding their plan of care.  Patient was instructed to call or return with any issues, questions, or concerns regarding their injury and/or treatment plan described above.     03/06/25 at 3:46 PM - Cole C Budinsky, MD    Office: [phone]    This note was prepared using voice recognition software.  The details of this note are correct and have been reviewed, and corrected to the best of my ability.  Some grammatical errors may persist related to the Dragon software.

## 2025-03-11 ENCOUNTER — HOSPITAL ENCOUNTER (OUTPATIENT)
Dept: RADIOLOGY | Facility: HOSPITAL | Age: 75
Discharge: HOME | End: 2025-03-11
Payer: COMMERCIAL

## 2025-03-11 ENCOUNTER — ANCILLARY PROCEDURE (OUTPATIENT)
Facility: HOSPITAL | Age: 75
End: 2025-03-11
Payer: COMMERCIAL

## 2025-03-11 DIAGNOSIS — M54.2 NECK PAIN: ICD-10-CM

## 2025-03-11 DIAGNOSIS — Z96.659 ASEPTIC LOOSENING OF PROSTHETIC KNEE, INITIAL ENCOUNTER (CMS-HCC): ICD-10-CM

## 2025-03-11 DIAGNOSIS — M50.30 DDD (DEGENERATIVE DISC DISEASE), CERVICAL: ICD-10-CM

## 2025-03-11 DIAGNOSIS — T84.038A ASEPTIC LOOSENING OF PROSTHETIC KNEE, INITIAL ENCOUNTER (CMS-HCC): ICD-10-CM

## 2025-03-11 DIAGNOSIS — M54.12 CERVICAL RADICULOPATHY: ICD-10-CM

## 2025-03-11 DIAGNOSIS — M25.562 LEFT KNEE PAIN, UNSPECIFIED CHRONICITY: ICD-10-CM

## 2025-03-11 DIAGNOSIS — S72.115A: ICD-10-CM

## 2025-03-11 PROCEDURE — 78315 BONE IMAGING 3 PHASE: CPT

## 2025-03-11 PROCEDURE — A9503 TC99M MEDRONATE: HCPCS | Performed by: FAMILY MEDICINE

## 2025-03-11 PROCEDURE — 72141 MRI NECK SPINE W/O DYE: CPT

## 2025-03-11 PROCEDURE — 3430000001 HC RX 343 DIAGNOSTIC RADIOPHARMACEUTICALS: Performed by: FAMILY MEDICINE

## 2025-03-11 PROCEDURE — 78315 BONE IMAGING 3 PHASE: CPT | Performed by: STUDENT IN AN ORGANIZED HEALTH CARE EDUCATION/TRAINING PROGRAM

## 2025-03-11 RX ADMIN — TECHNETIUM TC 99M MEDRONATE 23.6 MILLICURIE: 25 INJECTION, POWDER, FOR SOLUTION INTRAVENOUS at 08:33

## 2025-03-12 ENCOUNTER — OFFICE VISIT (OUTPATIENT)
Dept: ORTHOPEDIC SURGERY | Facility: CLINIC | Age: 75
End: 2025-03-12
Payer: COMMERCIAL

## 2025-03-12 DIAGNOSIS — G89.29 CHRONIC KNEE PAIN AFTER TOTAL REPLACEMENT OF LEFT KNEE JOINT: Primary | ICD-10-CM

## 2025-03-12 DIAGNOSIS — Z96.652 CHRONIC KNEE PAIN AFTER TOTAL REPLACEMENT OF LEFT KNEE JOINT: Primary | ICD-10-CM

## 2025-03-12 DIAGNOSIS — M25.562 CHRONIC KNEE PAIN AFTER TOTAL REPLACEMENT OF LEFT KNEE JOINT: Primary | ICD-10-CM

## 2025-03-12 PROCEDURE — 99214 OFFICE O/P EST MOD 30 MIN: CPT | Performed by: ORTHOPAEDIC SURGERY

## 2025-03-12 PROCEDURE — 1036F TOBACCO NON-USER: CPT | Performed by: ORTHOPAEDIC SURGERY

## 2025-03-12 PROCEDURE — 1159F MED LIST DOCD IN RCRD: CPT | Performed by: ORTHOPAEDIC SURGERY

## 2025-03-12 PROCEDURE — 1160F RVW MEDS BY RX/DR IN RCRD: CPT | Performed by: ORTHOPAEDIC SURGERY

## 2025-03-14 ENCOUNTER — APPOINTMENT (OUTPATIENT)
Dept: INTEGRATIVE MEDICINE | Facility: CLINIC | Age: 75
End: 2025-03-14
Payer: COMMERCIAL

## 2025-03-14 NOTE — PROGRESS NOTES
74-year-old male here for evaluation of his left knee.  He had a revision left knee replacement done elsewhere years ago he states that he had an injury to his left hip and was found to have an avulsion fracture of the greater troches but has been having pain into his left knee and Dr. Budinski did some x-rays and was concerned that there may be a fracture ordered a bone scan and had him refer to see me.  Currently in the process of seeing pain management and getting a workup for his his neck.    Location of pain: Most of his pain is in the lateral side of the hip on the left side some pain into the left knee.  Quality of pain: Some days are better than others but mostly when he weightbears  Modifying factors: Occultly getting up and down from seated position or standing for prolonged period time  Associated signs and symptoms: Denies any locking or giving way no redness swelling or drainage.  Previous treatment: Knee replacement on the left side in the past.        The patient's past medical history, family history, social history, and review of systems were documented on the patient's medical intake form.  The medical intake form was reviewed and scanned into the electronic medical record for future use.  History is otherwise negative except as stated in the HPI.    Physical exam    General: Alert and oriented to place, person, and time.  No acute distress and breathing comfortably; pleasant and cooperative with the examination.  HEENT: Head is normocephalic and atraumatic.  Neck: Supple, no visible swelling.  Cardiovascular: Good perfusion to the affected extremity.  Lungs: No audible wheezing or labored breathing.  Abdomen: Nondistended  HEME/Lymph : No visible abnormalities bilateral lower extremity    Extremity:  Left knee has well-healed incision no evidence of any instability no signs of infection brisk cap refill compartments are soft calf is nontender he does have significant tenderness palpation along  the hip and with range of motion of the hip distally he is neuro vastly intact full range of motion of the ankle    Diagnostics:  Bone scan is independently reviewed by myself today as well as previous x-rays    MR cervical spine wo IV contrast    Result Date: 3/11/2025  Interpreted By:  Richard Villalpando, STUDY: MR CERVICAL SPINE WO IV CONTRAST; ;  3/11/2025 9:40 am   INDICATION: Signs/Symptoms:pain.   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   ACCESSION NUMBER(S): [id number]   ORDERING CLINICIAN: COLE BUDINSKY   TECHNIQUE: MRI of the cervical spine was performed with the acquisition of sagittal T2, sagittal T1, sagittal STIR, axial T1, and axial T2 weighted sequences.   FINDINGS: Evaluation is somewhat degraded due to patient motion.   There is mild levocurvature which could be due to patient positioning. There is mild retrolisthesis at C3-C4. Vertebral body heights are maintained. There is mild intervertebral disc height narrowing at C2-C3 and moderate intervertebral disc height narrowing at C3-C4, C4-C5, and C5-C6. There are chronic degenerative endplate changes at multiple levels including osteophytic spurring. Marrow signal is within normal limits.   The cervical spinal cord is normal in signal and caliber.   At C2-C3, there is a disc osteophyte complex which partially effaces the ventral thecal sac and causes moderate spinal canal stenosis. There is mild left neural foraminal narrowing due to uncovertebral hypertrophy and facet osteoarthropathy. There is no striking narrowing of  the right neural foramen or right-sided facet osteoarthropathy.   At C3-C4, there is a disc osteophyte complex which along with ligamentum flavum buckling causes marked spinal canal stenosis. There is mass effect on the cord but there is no cord signal abnormality. There is mild-to-moderate left neural foraminal narrowing due to uncovertebral hypertrophy and facet osteoarthropathy. There is no narrowing of the right neural foramina and there is no right-sided facet osteoarthropathy.   At C4-C5, there is marked spinal canal stenosis due to disc osteophyte complex. There is mass effect on the cervical spinal cord without cord signal abnormality. There is marked bilateral neural foraminal narrowing due to uncovertebral hypertrophy. There is no striking facet osteoarthropathy.   At C5-C6, there is a disc osteophyte complex which causes marked spinal canal stenosis. There is mass effect on the cervical spinal cord but there is no cord signal abnormality. There is moderate bilateral neural foraminal narrowing due to uncovertebral hypertrophy. There is no striking facet osteoarthropathy.   At C6-C7, there is a small diffuse disc bulge. There is no spinal canal stenosis. There is minimal bilateral neural foraminal narrowing due to uncovertebral hypertrophy. There is no facet osteoarthropathy.   At C7-T1, there is no posterior disc contour abnormality. No spinal canal stenosis or neural foraminal narrowing. There is no facet osteoarthropathy.       Multilevel degenerative changes of the cervical spine including marked spinal canal stenosis at C3-C4, C4-C5, and C5-C6 and moderate spinal canal stenosis at C2-C3. There is mass effect on the cervical spinal cord at few levels but there is no cord signal abnormality.   This study was interpreted at [facility].   MACRO: None   Signed by: Richard Villalpando 3/11/2025 2:44 PM Dictation workstation:   [id number]    NM bone 3 phase    Result Date:  3/11/2025  Interpreted By:  Brenden Oviedo and Ogievich Taessa STUDY: NM BONE 3 PHASE;  3/11/2025 12:29 pm   INDICATION: Signs/Symptoms:pain.  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  Perfusion, early blood pool, and multiple delayed images of the   were then acquired.   FINDINGS: Recent left hip CT and left hip radiographs with greater trochanteric avulsion fracture.   Blood flow phase demonstrates increased perfusion to the left trochanteric region.   Blood pool phase demonstrates increased radiotracer uptake in the left trochanteric region. Photopenia in the bilateral knees consistent with bilateral knee arthroplasties. In addition, there is increased soft tissue uptake of the visualized right posterior calf.   Delayed phase demonstrates increased radiotracer uptake in the right greater trochanteric region. There is a mild radiotracer uptake along the tibial component of the knee arthroplasties.   Whole-body scan demonstrates increased uptake in the bilateral shoulders and bilateral feet consistent with degenerative arthritic changes.   The bilateral kidneys and urinary bladder identified compatible with physiologic radiotracer excretion       1. Increased radiotracer uptake in the left trochanteric region noted on all 3 phases  and is likely due to the greater trochanteric avulsion fracture seen on the recent left hip CT and radiographs. 2. Status post bilateral knee arthroplasties, mild radiotracer uptake surrounding the prostheses on delayed phase only, likely representing postsurgical bone remodeling. 3. Increased radiotracer soft tissue uptake of the visualized right posterior calf which may be seen with a muscle strain.   I personally reviewed the images/study and I agree with the findings as stated by Chad Aguilar DO, PGY-3. This study was interpreted at [place], [place].   MACRO: None   Signed by: Brenden Oviedo 3/11/2025 2:28 PM Dictation workstation:   [id number]    XR knee left 1-2 views    Result Date: 2/27/2025  Interpreted By:  Budinsky, Cole, STUDY: XR KNEE LEFT 1-2 VIEWS; ;  2/25/2025 10:49 am   INDICATION: Signs/Symptoms:pain.   ACCESSION NUMBER(S): [id number]   ORDERING CLINICIAN: COLE BUDINSKY       Left knee films demonstrate stable appearing total left knee arthroplasty. Question subtle lucency at the tibial implant. There is no obvious presence for displaced fracture or obvious hardware malfunction however. When compared to previous plain films dated 01/11/2025 there is very subtle increased lucency at the proximal tibia.     Signed by: Cole Budinsky 2/27/2025 8:15 PM Dictation workstation:   [id number]    XR hip left with pelvis when performed 2 or 3 views    Result Date: 2/27/2025  Interpreted By:  Budinsky, Cole, STUDY: XR HIP LEFT WITH PELVIS WHEN PERFORMED 2 OR 3 VIEWS; ;  2/25/2025 10:49 am   INDICATION: Signs/Symptoms:pain.   ACCESSION NUMBER(S): [id number]   ORDERING CLINICIAN: COLE BUDINSKY       Left hip films demonstrate stable appearing superior greater trochanteric avulsion fracture. No presence for any obvious new or additional fracture seen. Minimal degenerative changes about the femoroacetabular joint. Overall impression stable appearing greater  tuberosity avulsion fracture of the left hip.     Signed by: Cole Budinsky 2/27/2025 8:12 PM Dictation workstation:   [id number]    XR cervical spine complete 4-5 views    Result Date: 2/25/2025  Interpreted By:  Budinsky, Cole, STUDY: XR CERVICAL SPINE COMPLETE 4-5 VIEWS; ;  2/25/2025 10:49 am   INDICATION: Signs/Symptoms:pain.   ACCESSION NUMBER(S): [id number]   ORDERING CLINICIAN: COLE BUDINSKY       Cervical spine films demonstrate stable appearing degenerative changes throughout with ventral osteophytes and advanced degenerative disc disease. Reversal of the lordotic curve is noted. No obvious acute bony abnormality when compared to prior. Flexion extension films demonstrate no obvious presence for dynamic instability.     Signed by: Cole Budinsky 2/25/2025 7:48 PM Dictation workstation:   [id number]         Procedures  [none   ]    Assessment:  Stable appearing left knee replacement    Treatment plan:  1.  The natural history of the condition and its associated treatment alternatives including surgical and nonsurgical options were discussed with the patient at length.  2.  Patient with significant impairment of bodily function related to his left leg.  Studies available for review are independently reviewed by myself and interpreted based on my review, including labs and radiology and outside notes are reviewed if available.  I think most of his issues coming from his hip and I reassured him that his knee replacement looks stable he is got multiple other medical and orthopedic issues ongoing which are limiting him more than anything else.  We discussed surgical and nonsurgical options at this point we both agreed that nonsurgical option is probably the best path for him.  His plan is to continue the workup for his neck and numbness in both hands and he is happy to hear that his knee replacement does not need additional surgical intervention.  I also explained that the greater troches fracture can  sometimes take 3 months to heal and I think that is contributing to his knee pain as well.  3. [   ]  4.  All of the patient's questions were answered.    No orders of the defined types were placed in this encounter.      This note was prepared using voice recognition software.  The details of this note are correct and have been reviewed, and corrected to the best of my ability.  Some grammatical areas may persist related to the Dragon software    Nguyễn Cornejo MD  Senior Attending Physician  [facility]  [facility] [place]    [phone]

## 2025-03-17 ENCOUNTER — APPOINTMENT (OUTPATIENT)
Dept: PAIN MEDICINE | Facility: CLINIC | Age: 75
End: 2025-03-17
Payer: COMMERCIAL

## 2025-03-19 ENCOUNTER — OFFICE VISIT (OUTPATIENT)
Dept: PAIN MEDICINE | Facility: CLINIC | Age: 75
End: 2025-03-19
Payer: COMMERCIAL

## 2025-03-19 DIAGNOSIS — Z96.659 ASEPTIC LOOSENING OF PROSTHETIC KNEE, INITIAL ENCOUNTER (CMS-HCC): ICD-10-CM

## 2025-03-19 DIAGNOSIS — M25.562 LEFT KNEE PAIN, UNSPECIFIED CHRONICITY: ICD-10-CM

## 2025-03-19 DIAGNOSIS — M50.30 DDD (DEGENERATIVE DISC DISEASE), CERVICAL: ICD-10-CM

## 2025-03-19 DIAGNOSIS — S72.115A: ICD-10-CM

## 2025-03-19 DIAGNOSIS — T84.038A ASEPTIC LOOSENING OF PROSTHETIC KNEE, INITIAL ENCOUNTER (CMS-HCC): ICD-10-CM

## 2025-03-19 DIAGNOSIS — M54.2 NECK PAIN: ICD-10-CM

## 2025-03-19 DIAGNOSIS — M54.12 CERVICAL RADICULOPATHY: ICD-10-CM

## 2025-03-19 DIAGNOSIS — M96.1 POSTLAMINECTOMY SYNDROME, LUMBAR REGION: Primary | ICD-10-CM

## 2025-03-19 PROCEDURE — G2211 COMPLEX E/M VISIT ADD ON: HCPCS | Performed by: PAIN MEDICINE

## 2025-03-19 PROCEDURE — 1159F MED LIST DOCD IN RCRD: CPT | Performed by: PAIN MEDICINE

## 2025-03-19 PROCEDURE — 99214 OFFICE O/P EST MOD 30 MIN: CPT | Performed by: PAIN MEDICINE

## 2025-03-19 PROCEDURE — 99204 OFFICE O/P NEW MOD 45 MIN: CPT | Performed by: PAIN MEDICINE

## 2025-03-19 RX ORDER — GABAPENTIN 100 MG/1
100 CAPSULE ORAL 3 TIMES DAILY
Qty: 90 CAPSULE | Refills: 11 | Status: SHIPPED | OUTPATIENT
Start: 2025-03-19 | End: 2026-03-19

## 2025-03-19 ASSESSMENT — PAIN SCALES - GENERAL: PAINLEVEL_OUTOF10: 10 - WORST POSSIBLE PAIN

## 2025-03-19 ASSESSMENT — PAIN - FUNCTIONAL ASSESSMENT: PAIN_FUNCTIONAL_ASSESSMENT: 0-10

## 2025-03-19 NOTE — H&P
History Of Present Illness  Nguyễn Hernandez is a 74 y.o. male presenting with back pain and neck pain   He is giving the history of a remote surgery that was performed multiple years ago in the lower lumbar spine area it seems from old x-rays performed at [facility] that some fusion was involved currently complaining of more of back pain compared to the neck area and describing the pain as being constant in the lower back despite the position being sitting or standing having difficulty standing up and walking secondary to shaking and pain   Per patient recent mri has generated referral to pain management. Resides at [facility]. Has had back surgery in the past. Is having PT presently.   Rating His pain at a 10 out of 10.  Describing it as a sharp pain currently on Celebrex 100 mg prior injection in the lumbar spine area that he described as being beneficial but he did not have any intervention in a long time.     Past Medical History  Past Medical History:   Diagnosis Date    Parkinson's disease (tremor, stiffness, slow motion, unstable posture) (Multi)     Personal history of other diseases of the circulatory system     History of hypertension    Personal history of transient ischemic attack (TIA), and cerebral infarction without residual deficits     History of stroke     Patient Active Problem List   Diagnosis    Failure to thrive in adult   ;  Surgical History  Past Surgical History:   Procedure Laterality Date    OTHER SURGICAL HISTORY  05/26/2022    Knee replacement   Back surgery   Social History  He reports that he has quit smoking. His smoking use included cigarettes. He has never used smokeless tobacco. He reports that he does not drink alcohol and does not use drugs.    Family History  No family history on file.     Allergies  Allergies   Allergen Reactions    Ace Inhibitors Swelling and Other     04/30/2021 presented to ED with spontaneous angioedema to lips and tongue, taking Lisinopril at home. Lisinopril  discontinued.    Morphine Other     Other reaction(s): Mental Status Change   Manic delirium.    Sulfamethoxazole-Trimethoprim Itching and Other     Pt developed truncal rash in 2015,he also developed sever eruptive skin rash in 2016 after he took it for the second time with no mucosal involvement .    Benzodiazepines Other    Diphenhydramine Other    Fentanyl Other     Other reaction(s): Other: See Comments   headache    Haloperidol Other    Ketoconazole Other     Shampoo - rash and peeling of skin    Acetaminophen Rash and Other     Pt states he is not allergic 2/4/20    Amoxicillin Itching and Rash     Negative skin test and oral challenge amoxicillin 1/9/15. See allergy consult.   Took on 6/24/15 and had rash again.    Azithromycin Rash and Other     Rash and itching    Latex Rash    Oxybutynin Rash     Review of Systems   12 Systems have been reviewed as follows.   Constitutional: Fever, weight gain, weight loss, appetite change, night sweats, fatigue, chills.  Eyes : blurry, double vision, vision, loss, tearing, redness, pain, sensitivity to light, glaucoma.  Ears, nose, mouth, and throat: Hearing loss, ringing in the ears, ear pain, nasal congestion, nasal drainage, nosebleeds, mouth, throat, irritation tooth problem.  Cardiovascular :chest pain, pressure, heart tracing,palpaitations , sweating, leg swelling, high or low blood pressure  Pulmonary: Cough, yellow or green sputum, blood and sputum, shortness of breath, wheezing  Gastrointestinal: Nause, vomiting, diarrhea, constipation, pain, blood in stool, or vomitus, heartburn, difficulty swallowing  Genitourinary: incontinence, abnormal bleeding, abnormal discharge, urinary frequency, urinary hesitancy, pain, impotence sexual problem, infection, urinary retention  Musculoskeletal: Pain, stiffness, joint, redness or warmth, arthritis, back pain, weakness, muscle wasting, sprain or fracture  Neuro: Weight weakness, dizziness, change in voice, change in taste  change in vision, change in hearing, loss, or change of sensation, trouble walking, balance problems coordination problems, shaking, speech problem  Endocrine , cold or heat intolerance, blood sugar problem, weight gain or loss missed periods hot flashes, sweats, change in body hair, change in libido, increased thirst, increased urination  Heme/lymph: Swelling, bleeding, problem anemia, bruising, enlarged lymph nodes  Allergic/immunologic: H. plus nasal drip, watery itchy eyes, nasal drainage, immunosuppressed  The above, were reviewed and noted negative except as noted.   Positive for tremors, positive for constipation  Physical Exam   Vital signs reviewed, documented in chart     General:  Appears well, does not look in any major distress  Alert    HEENT:  Head atraumatic  Eyes normal inspection  PERRL  Normal ENT inspection  No signs of dehydration    NECK:  Normal inspection  Range of motion within normal     RESPIRATORY:  No respiratory distress    CVS:  Heart rate and rhythm regular    ABDOMEN/GI  Soft  Non-tender  No distention  No organomegaly      BACK:  Normal inspection, flexion and extension limited   Positive tenderness upon the palpation of the facet joint  Si joints none tender to palpations     EXTREMITIES:  Non-Tender  Full ROM  Normal appearance  No Pedal edema  Power symmetrical , sensory examination preserved.    NEURO:  Alert and oriented X 3  CNS normal as tested without focal neurological deficit   Sensation normal  Motor ambulates with the walker   reflexes absent    PSYCH:  Mood normal  Affect normal    SKIN:  Color normal  No rash  Warm  Dry  no sign of skin marking supportive of IV drug usage /abuse.     Last Recorded Vitals  There were no vitals taken for this visit.  MR cervical spine wo IV contrast    Result Date: 3/11/2025  Interpreted By:  Richard Villalpando, STUDY: MR CERVICAL SPINE WO IV CONTRAST; ;  3/11/2025 9:40 am   INDICATION: Signs/Symptoms:pain.   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   ACCESSION NUMBER(S): [id number]   ORDERING CLINICIAN: COLE BUDINSKY   TECHNIQUE: MRI of the cervical spine was performed with the acquisition of sagittal T2, sagittal T1, sagittal STIR, axial T1, and axial T2 weighted sequences.   FINDINGS: Evaluation is somewhat degraded due to patient motion.   There is mild levocurvature which could be due to patient positioning. There is mild retrolisthesis at C3-C4. Vertebral body heights are maintained. There is mild intervertebral disc height narrowing at C2-C3 and moderate intervertebral disc height narrowing at C3-C4, C4-C5, and C5-C6. There are chronic degenerative endplate changes at multiple levels including osteophytic spurring. Marrow signal is within normal limits.   The cervical spinal cord is normal in signal and caliber.   At C2-C3, there is a disc osteophyte complex which partially effaces the ventral thecal sac and causes moderate spinal canal stenosis. There is mild left neural foraminal narrowing due to uncovertebral hypertrophy and facet osteoarthropathy. There is no striking narrowing of the right neural foramen or right-sided facet osteoarthropathy.   At C3-C4, there is a disc osteophyte complex which along with ligamentum flavum buckling causes marked spinal canal stenosis. There is mass effect on the cord but there is no cord signal abnormality. There is mild-to-moderate left neural foraminal narrowing due to uncovertebral hypertrophy and facet osteoarthropathy. There is no narrowing of the right neural foramina  and there is no right-sided facet osteoarthropathy.   At C4-C5, there is marked spinal canal stenosis due to disc osteophyte complex. There is mass effect on the cervical spinal cord without cord signal abnormality. There is marked bilateral neural foraminal narrowing due to uncovertebral hypertrophy. There is no striking facet osteoarthropathy.   At C5-C6, there is a disc osteophyte complex which causes marked spinal canal stenosis. There is mass effect on the cervical spinal cord but there is no cord signal abnormality. There is moderate bilateral neural foraminal narrowing due to uncovertebral hypertrophy. There is no striking facet osteoarthropathy.   At C6-C7, there is a small diffuse disc bulge. There is no spinal canal stenosis. There is minimal bilateral neural foraminal narrowing due to uncovertebral hypertrophy. There is no facet osteoarthropathy.   At C7-T1, there is no posterior disc contour abnormality. No spinal canal stenosis or neural foraminal narrowing. There is no facet osteoarthropathy.       Multilevel degenerative changes of the cervical spine including marked spinal canal stenosis at C3-C4, C4-C5, and C5-C6 and moderate spinal canal stenosis at C2-C3. There is mass effect on the cervical spinal cord at few levels but there is no cord signal abnormality.   This study was interpreted at [facility].   MACRO: None   Signed by: Richard Villalpando 3/11/2025 2:44 PM Dictation workstation:   [id number]    XR cervical spine complete 4-5 views    Result Date: 2/25/2025  Interpreted By:  Budinsky, Cole, STUDY: XR CERVICAL SPINE COMPLETE 4-5 VIEWS; ;  2/25/2025 10:49 am   INDICATION: Signs/Symptoms:pain.   ACCESSION NUMBER(S): [id number]   ORDERING CLINICIAN: COLE BUDINSKY       Cervical spine films demonstrate stable appearing degenerative changes throughout with ventral osteophytes and advanced degenerative disc disease. Reversal of the lordotic curve is noted. No  obvious acute bony abnormality when compared to prior. Flexion extension films demonstrate no obvious presence for dynamic instability.     Signed by: Cole Budinsky 2/25/2025 7:48 PM Dictation workstation:   [id number]    CT cervical spine wo IV contrast    Result Date: 1/11/2025  Interpreted By:  Toby Vinson, STUDY: CT CERVICAL SPINE WO IV CONTRAST;  1/11/2025 11:12 am   INDICATION: Fall and injury   COMPARISON: 09/29/2016   ACCESSION NUMBER(S): [id number]   ORDERING CLINICIAN: SD GARCIA   TECHNIQUE: Transaxial images with orthogonal reconstructions   FINDINGS: VERTEBRAL ALIGNMENT: Mild reversal of the lordotic curvature probably from spondylosis as this is similar to the prior exam, but may be from spasm given history of trauma. There is also a mild dextrocurvature of the upper cervical level.   CRANIOCERVICAL JUNCTION: Unremarkable   BONY STRUCTURES: No fracture or dislocation are evident.   THE CERVICAL LEVELS INCLUDING DISC SPACES, APOPHYSEAL AND UNCOVERTEBRAL JOINTS:  Moderate spondylosis. Namely there is marked narrowing of the C3-4 and C4-5 disc interspaces with a mild retrolisthesis and moderate marginal osteophyte formation, and marked narrowing of the C5-6 disc interspace with mild marginal osteophyte formation. There is moderate narrowing of the C2-3 disc interspace. Spondylosis is similar to the prior exam..   ADDITIONAL FINDINGS: Moderate-to-marked atherosclerotic calcification is noted at the carotid bifurcations.       Moderate spondylosis similar to the prior exam, otherwise without acute findings.   Signed by: Toby Vinson 1/11/2025 11:36 AM Dictation workstation:   [id number]    XR cervical spine complete 4-5 views    Result Date: 11/18/2024  Interpreted By:  Budinsky, Cole, STUDY: XR CERVICAL SPINE COMPLETE 4-5 VIEWS; ;  11/18/2024 11:08 am   INDICATION: Signs/Symptoms:pain.   ACCESSION NUMBER(S): [id number]   ORDERING CLINICIAN: COLE BUDINSKY       Four views cervical spine  demonstrate loss of the cervical lordotic curve with some mild reversal with apex at the C2-C3 level. Significant ventral osteophytes seen throughout the C2 through C7 level. Significant degenerative disc disease also noted throughout. No obvious presence for fracture or listhesis.     Signed by: Cole Budinsky 11/18/2024 1:07 PM Dictation workstation:   [id number]     Assessment/Plan   74 years old with history and physical examination supportive of severe stenosis in the cervical spine area status post multiple surgery in the thoracic and the lumbar spine area with persistent chronic low back pain    Plan  Advised the patient that I would recommend obtaining x-ray for the thoracic and the lumbar spine area check on the condition of his spine I am referring the patient to discuss decompression of the cervical spine area with spine surgery team if he is not on surgical in nature then I will be more than happy to assist him with intervention targeting the cervical spine at the lower lumbar spine area including medial nerve branch blocks and epidural steroid injections in the meantime I will be starting him on a trial of gabapentin 100 mg p.o. 3 times daily      The above clinical summary has been dictated with voice recognition software. It has not been proofread for grammatical errors, typographical mistakes, or other semantic inconsistencies.    Thank you for visiting our office today. It was our pleasure to take part in your healthcare.     Please do not hesitate to contact the pain clinic after your visit with any questions or concerns at  M-F 8-4 pm       Jun Beasley M.D.  Medical Director , Division of Pain Medicine [facility]   of Anesthesiology and Pain Medicine  [facility]     [address]  [address]  [place] [id number]     Office: [phone]  Fax: [phone]      Jun Beasley MD

## 2025-03-19 NOTE — PROGRESS NOTES
Per patient recent mri has generated referral to pain management. Resides at [facility]. Has had back surgery in the past. Is having PT presently.

## 2025-03-21 ENCOUNTER — APPOINTMENT (OUTPATIENT)
Dept: INTEGRATIVE MEDICINE | Facility: CLINIC | Age: 75
End: 2025-03-21
Payer: COMMERCIAL

## 2025-04-01 ENCOUNTER — APPOINTMENT (OUTPATIENT)
Dept: NEUROSURGERY | Facility: CLINIC | Age: 75
End: 2025-04-01
Payer: COMMERCIAL

## 2025-04-01 DIAGNOSIS — M96.1 POSTLAMINECTOMY SYNDROME, LUMBAR REGION: ICD-10-CM

## 2025-04-01 DIAGNOSIS — G89.29 CHRONIC BILATERAL THORACIC BACK PAIN: Primary | ICD-10-CM

## 2025-04-01 DIAGNOSIS — M54.12 CERVICAL RADICULOPATHY: ICD-10-CM

## 2025-04-01 DIAGNOSIS — M54.6 CHRONIC BILATERAL THORACIC BACK PAIN: Primary | ICD-10-CM

## 2025-04-01 PROCEDURE — 99204 OFFICE O/P NEW MOD 45 MIN: CPT | Performed by: PHYSICIAN ASSISTANT

## 2025-04-01 NOTE — PROGRESS NOTES
"[facility] [place]  Department of Neurological Surgery  New Patient Visit    History of Present Illness:  Nguyễn Hernandez is a 74 y.o. year old male who presents to the spine clinic with midthoracic back pain radiating right greater than left in addition to bilateral low lumbar and sacral pain.  Patient has history of thoracolumbar fusion T9-S1 by Dr. Dorsey at [facility].  States that just over a month he has had increasing mid and lower back pain worse when he sits down.  It is improved when he reclines or lays down in bed.  Continues to rate his pain at 5/10 in endorses radiating pain only to the right anterior thigh medication.  He has ongoing physical therapy at \"[facility] where he is a resident.  Recent x-rays obtained thoracic and lumbar spine show degenerative changes above and below his prior fusion construct.  He denies any new bowel or bladder changes.  Does have ambulatory dysfunction and utilizing a wheeled walker.     Prior Spine Surgeries: T9-S1 posterior fusion    Physical Therapy: ongoing  Diabetic:   no   Osteoporosis: no  Patient's BMI is There is no height or weight on file to calculate BMI.    14/14 systems reviewed and negative other than what is listed in the history of present illness    Patient Active Problem List   Diagnosis    Failure to thrive in adult     Past Medical History:   Diagnosis Date    Parkinson's disease (tremor, stiffness, slow motion, unstable posture) (Multi)     Personal history of other diseases of the circulatory system     History of hypertension    Personal history of transient ischemic attack (TIA), and cerebral infarction without residual deficits     History of stroke     Past Surgical History:   Procedure Laterality Date    OTHER SURGICAL HISTORY  05/26/2022    Knee replacement     Social History     Tobacco Use    Smoking status: Former     Types: Cigarettes    Smokeless tobacco: Never   Substance Use Topics    Alcohol use: Never "     family history is not on file.    Current Outpatient Medications:     amLODIPine (Norvasc) 10 mg tablet, Take 1 tablet (10 mg) by mouth once daily., Disp: , Rfl:     aspirin 81 mg EC tablet, Take 1 tablet (81 mg) by mouth once daily., Disp: , Rfl:     atorvastatin (Lipitor) 80 mg tablet, Take 1 tablet (80 mg) by mouth once daily., Disp: , Rfl:     carbidopa-levodopa (Sinemet)  mg tablet, Take 1 tablet by mouth 3 times a day., Disp: , Rfl:     celecoxib (CeleBREX) 100 mg capsule, 1 capsule (100 mg) once daily., Disp: , Rfl:     citalopram (CeleXA) 20 mg tablet, 1 tablet (20 mg)., Disp: , Rfl:     clopidogrel (Plavix) 75 mg tablet, Take 1 tablet (75 mg) by mouth once daily., Disp: , Rfl:     divalproex (Depakote) 125 mg EC tablet, 1 tablet (125 mg). 3 tabs BID, Disp: , Rfl:     gabapentin (Neurontin) 100 mg capsule, Take 1 capsule (100 mg) by mouth 3 times a day., Disp: 90 capsule, Rfl: 11    hydrocortisone 2.5 % cream, , Disp: , Rfl:     levothyroxine (Synthroid, Levoxyl) 50 mcg tablet, Take by mouth., Disp: , Rfl:     lidocaine (Lidoderm) 5 % patch, Place 1 patch over 12 hours on the skin once daily. Remove & discard patch within 12 hours or as directed by MD., Disp: 30 patch, Rfl: 0    magnesium hydroxide (Milk of Magnesia) 400 mg/5 mL suspension, Take by mouth once daily as needed for constipation., Disp: , Rfl:     melatonin 3 mg capsule, Take by mouth., Disp: , Rfl:     methylPREDNISolone (Medrol Dospak) 4 mg tablets, Use as directed by package instructions, Disp: 21 tablet, Rfl: 0    pantoprazole (ProtoNix) 40 mg EC tablet, 1 tablet (40 mg) once daily in the morning. Take before meals., Disp: , Rfl:     thiamine 100 mg tablet, Take 1 tablet (100 mg) by mouth., Disp: , Rfl:     tiZANidine (Zanaflex) 4 mg tablet, Take 0.5 tablets (2 mg) by mouth as needed at bedtime for muscle spasms for up to 14 days., Disp: 7 tablet, Rfl: 0    traZODone (Desyrel) 100 mg tablet, 1 tablet (100 mg) once daily at  bedtime., Disp: , Rfl:   Allergies   Allergen Reactions    Ace Inhibitors Swelling and Other     04/30/2021 presented to ED with spontaneous angioedema to lips and tongue, taking Lisinopril at home. Lisinopril discontinued.    Morphine Other     Other reaction(s): Mental Status Change   Manic delirium.    Sulfamethoxazole-Trimethoprim Itching and Other     Pt developed truncal rash in 2015,he also developed sever eruptive skin rash in 2016 after he took it for the second time with no mucosal involvement .    Benzodiazepines Other    Diphenhydramine Other    Fentanyl Other     Other reaction(s): Other: See Comments   headache    Haloperidol Other    Ketoconazole Other     Shampoo - rash and peeling of skin    Acetaminophen Rash and Other     Pt states he is not allergic 2/4/20    Amoxicillin Itching and Rash     Negative skin test and oral challenge amoxicillin 1/9/15. See allergy consult.   Took on 6/24/15 and had rash again.    Azithromycin Rash and Other     Rash and itching    Latex Rash    Oxybutynin Rash       Physical Examination    General: Well developed, awake/alert/oriented x3, no distress, alert and cooperative  Skin: Warm and dry, no lesions, no rashes  ENMT: Mucous membranes moist, no apparent injury, no lesions seen  Head/Neck: Neck Supple, no apparent injury  Respiratory/Thorax: Normal breath sounds with good chest expansion, thorax symmetric  Cardiovascular: No pitting edema, no JVD    Motor Strength: 4/5 bilateral knee flexion, hip extension, otherwise 5/5 Throughout all extremities    Muscle Bulk: Normal and symmetric in all extremities    Posture:   -- Cervical: Normal  -- Thoracic: Normal  -- Lumbar : Normal  Paraspinal muscle spasm/tenderness present bilateral right greater than left lower lumbar and SI joint  Midline tenderness absent    Sensation: intact to light touch      Results    I personally reviewed and interpreted the imaging results which included MRI cervical spine identifying  "central canal stenosis along with x-rays showing degenerative changes adjacent to fusion construct    Assessment and Plan:  Nguyễn Hernandez is a 74 y.o. year old male who presents to the spine clinic with midthoracic back pain radiating right greater than left in addition to bilateral low lumbar and sacral pain.  Patient has history of thoracolumbar fusion T9-S1 by Dr. Dorsey at [facility].  States that just over a month he has had increasing mid and lower back pain worse when he sits down.  It is improved when he reclines or lays down in bed.  Continues to rate his pain at 5/10 in endorses radiating pain only to the right anterior thigh medication.  He has ongoing physical therapy at \"[facility] where he is a resident.  Recent x-rays obtained thoracic and lumbar spine show degenerative changes above and below his prior fusion construct.  He denies any new bowel or bladder changes.  Does have ambulatory dysfunction and utilizing a wheeled walker.     Patient has progressed through physical therapy and continues with pain management physician.  X-rays show degenerative changes above and below his prior fusion construct and symptoms correlating.  MRI necessary thoracic and lumbar spine for further evaluation and will follow-up with attending surgeon afterwards.      I have reviewed all prior documentation and reviewed the electronic medical record since admission. I have personally have reviewed all advanced imaging not just the reports and used my interpretation as documented as the relevant findings. I have reviewed the risks and benefits of all treatment recommendations listed in this note with the patient and family.       The above clinical summary has been dictated with voice recognition software. It has not been proofread for grammatical errors, typographical mistakes, or other semantic inconsistencies.    Thank you for visiting our office today. It was our pleasure to take part in your healthcare.     Do " not hesitate to call with any questions regarding your plan of care after leaving at [phone] M-F 8am-4pm.     To clinicians, thank you very much for this kind referral. It is a privilege to partner with you in the care of your patients. My office would be delighted to assist you with any further consultations or with questions regarding the plan of care outlined. Do not hesitate to call the office or contact me directly.       Sincerely,      SHERI Chicas, PA-C  Associate Physician Assistant, Neurosurgery  Clinical   [facility] [employer] [place] [address]    Phone: [phone]  Fax: [phone]

## 2025-04-15 ENCOUNTER — APPOINTMENT (OUTPATIENT)
Dept: ORTHOPEDIC SURGERY | Facility: CLINIC | Age: 75
End: 2025-04-15
Payer: COMMERCIAL

## 2025-06-12 ENCOUNTER — APPOINTMENT (OUTPATIENT)
Dept: CARDIOLOGY | Facility: HOSPITAL | Age: 75
End: 2025-06-12
Payer: COMMERCIAL

## 2025-06-12 ENCOUNTER — APPOINTMENT (OUTPATIENT)
Dept: RADIOLOGY | Facility: HOSPITAL | Age: 75
End: 2025-06-12
Payer: COMMERCIAL

## 2025-06-12 ENCOUNTER — HOSPITAL ENCOUNTER (EMERGENCY)
Facility: HOSPITAL | Age: 75
Discharge: HOME | End: 2025-06-12
Attending: EMERGENCY MEDICINE
Payer: COMMERCIAL

## 2025-06-12 VITALS
TEMPERATURE: 97.3 F | SYSTOLIC BLOOD PRESSURE: 130 MMHG | DIASTOLIC BLOOD PRESSURE: 76 MMHG | HEART RATE: 70 BPM | BODY MASS INDEX: 24.91 KG/M2 | OXYGEN SATURATION: 96 % | RESPIRATION RATE: 18 BRPM | HEIGHT: 66 IN | WEIGHT: 155 LBS

## 2025-06-12 DIAGNOSIS — R53.1 GENERALIZED WEAKNESS: Primary | ICD-10-CM

## 2025-06-12 LAB
ALBUMIN SERPL BCP-MCNC: 4.3 G/DL (ref 3.4–5)
ALP SERPL-CCNC: 75 U/L (ref 33–136)
ALT SERPL W P-5'-P-CCNC: 10 U/L (ref 10–52)
ANION GAP SERPL CALC-SCNC: 12 MMOL/L (ref 10–20)
APPEARANCE UR: CLEAR
AST SERPL W P-5'-P-CCNC: 19 U/L (ref 9–39)
ATRIAL RATE: 300 BPM
ATRIAL RATE: 70 BPM
BASOPHILS # BLD AUTO: 0.01 X10*3/UL (ref 0–0.1)
BASOPHILS NFR BLD AUTO: 0.1 %
BILIRUB SERPL-MCNC: 0.5 MG/DL (ref 0–1.2)
BILIRUB UR STRIP.AUTO-MCNC: NEGATIVE MG/DL
BUN SERPL-MCNC: 14 MG/DL (ref 6–23)
CALCIUM SERPL-MCNC: 8.9 MG/DL (ref 8.6–10.3)
CARDIAC TROPONIN I PNL SERPL HS: 5 NG/L (ref 0–20)
CARDIAC TROPONIN I PNL SERPL HS: 6 NG/L (ref 0–20)
CHLORIDE SERPL-SCNC: 99 MMOL/L (ref 98–107)
CO2 SERPL-SCNC: 31 MMOL/L (ref 21–32)
COLOR UR: YELLOW
CREAT SERPL-MCNC: 0.84 MG/DL (ref 0.5–1.3)
EGFRCR SERPLBLD CKD-EPI 2021: >90 ML/MIN/1.73M*2
EOSINOPHIL # BLD AUTO: 0.06 X10*3/UL (ref 0–0.4)
EOSINOPHIL NFR BLD AUTO: 0.9 %
ERYTHROCYTE [DISTWIDTH] IN BLOOD BY AUTOMATED COUNT: 18 % (ref 11.5–14.5)
GLUCOSE SERPL-MCNC: 105 MG/DL (ref 74–99)
GLUCOSE UR STRIP.AUTO-MCNC: NORMAL MG/DL
HCT VFR BLD AUTO: 41.1 % (ref 41–52)
HGB BLD-MCNC: 13.1 G/DL (ref 13.5–17.5)
IMM GRANULOCYTES # BLD AUTO: 0.03 X10*3/UL (ref 0–0.5)
IMM GRANULOCYTES NFR BLD AUTO: 0.4 % (ref 0–0.9)
KETONES UR STRIP.AUTO-MCNC: NEGATIVE MG/DL
LEUKOCYTE ESTERASE UR QL STRIP.AUTO: NEGATIVE
LYMPHOCYTES # BLD AUTO: 1.68 X10*3/UL (ref 0.8–3)
LYMPHOCYTES NFR BLD AUTO: 23.9 %
MCH RBC QN AUTO: 29.2 PG (ref 26–34)
MCHC RBC AUTO-ENTMCNC: 31.9 G/DL (ref 32–36)
MCV RBC AUTO: 92 FL (ref 80–100)
MONOCYTES # BLD AUTO: 0.76 X10*3/UL (ref 0.05–0.8)
MONOCYTES NFR BLD AUTO: 10.8 %
NEUTROPHILS # BLD AUTO: 4.49 X10*3/UL (ref 1.6–5.5)
NEUTROPHILS NFR BLD AUTO: 63.9 %
NITRITE UR QL STRIP.AUTO: NEGATIVE
NRBC BLD-RTO: 0 /100 WBCS (ref 0–0)
P AXIS: 102 DEGREES
P AXIS: 78 DEGREES
P OFFSET: 152 MS
P OFFSET: 156 MS
P ONSET: 118 MS
P ONSET: 136 MS
PH UR STRIP.AUTO: 6 [PH]
PLATELET # BLD AUTO: 235 X10*3/UL (ref 150–450)
POTASSIUM SERPL-SCNC: 4 MMOL/L (ref 3.5–5.3)
PR INTERVAL: 216 MS
PROT SERPL-MCNC: 6.7 G/DL (ref 6.4–8.2)
PROT UR STRIP.AUTO-MCNC: NEGATIVE MG/DL
Q ONSET: 217 MS
Q ONSET: 226 MS
QRS COUNT: 12 BEATS
QRS COUNT: 12 BEATS
QRS DURATION: 68 MS
QRS DURATION: 86 MS
QT INTERVAL: 376 MS
QT INTERVAL: 400 MS
QTC CALCULATION(BAZETT): 406 MS
QTC CALCULATION(BAZETT): 438 MS
QTC FREDERICIA: 396 MS
QTC FREDERICIA: 425 MS
R AXIS: 60 DEGREES
R AXIS: 66 DEGREES
RBC # BLD AUTO: 4.49 X10*6/UL (ref 4.5–5.9)
RBC # UR STRIP.AUTO: NEGATIVE MG/DL
SODIUM SERPL-SCNC: 138 MMOL/L (ref 136–145)
SP GR UR STRIP.AUTO: 1.02
T AXIS: 44 DEGREES
T AXIS: 72 DEGREES
T OFFSET: 414 MS
T OFFSET: 417 MS
TSH SERPL-ACNC: 2.03 MIU/L (ref 0.44–3.98)
UROBILINOGEN UR STRIP.AUTO-MCNC: NORMAL MG/DL
VENTRICULAR RATE: 70 BPM
VENTRICULAR RATE: 72 BPM
WBC # BLD AUTO: 7 X10*3/UL (ref 4.4–11.3)

## 2025-06-12 PROCEDURE — 84443 ASSAY THYROID STIM HORMONE: CPT

## 2025-06-12 PROCEDURE — 99285 EMERGENCY DEPT VISIT HI MDM: CPT | Performed by: EMERGENCY MEDICINE

## 2025-06-12 PROCEDURE — 80053 COMPREHEN METABOLIC PANEL: CPT

## 2025-06-12 PROCEDURE — 84484 ASSAY OF TROPONIN QUANT: CPT

## 2025-06-12 PROCEDURE — 99285 EMERGENCY DEPT VISIT HI MDM: CPT | Mod: 25 | Performed by: EMERGENCY MEDICINE

## 2025-06-12 PROCEDURE — 72125 CT NECK SPINE W/O DYE: CPT

## 2025-06-12 PROCEDURE — 93005 ELECTROCARDIOGRAM TRACING: CPT

## 2025-06-12 PROCEDURE — 85025 COMPLETE CBC W/AUTO DIFF WBC: CPT

## 2025-06-12 PROCEDURE — 81003 URINALYSIS AUTO W/O SCOPE: CPT

## 2025-06-12 PROCEDURE — 73502 X-RAY EXAM HIP UNI 2-3 VIEWS: CPT | Mod: LEFT SIDE | Performed by: RADIOLOGY

## 2025-06-12 PROCEDURE — 73502 X-RAY EXAM HIP UNI 2-3 VIEWS: CPT | Mod: LT

## 2025-06-12 PROCEDURE — 36415 COLL VENOUS BLD VENIPUNCTURE: CPT

## 2025-06-12 PROCEDURE — 70450 CT HEAD/BRAIN W/O DYE: CPT

## 2025-06-12 PROCEDURE — 71045 X-RAY EXAM CHEST 1 VIEW: CPT

## 2025-06-12 PROCEDURE — 70450 CT HEAD/BRAIN W/O DYE: CPT | Performed by: STUDENT IN AN ORGANIZED HEALTH CARE EDUCATION/TRAINING PROGRAM

## 2025-06-12 PROCEDURE — 71045 X-RAY EXAM CHEST 1 VIEW: CPT | Performed by: RADIOLOGY

## 2025-06-12 PROCEDURE — 72125 CT NECK SPINE W/O DYE: CPT | Performed by: STUDENT IN AN ORGANIZED HEALTH CARE EDUCATION/TRAINING PROGRAM

## 2025-06-12 ASSESSMENT — LIFESTYLE VARIABLES
EVER FELT BAD OR GUILTY ABOUT YOUR DRINKING: NO
EVER HAD A DRINK FIRST THING IN THE MORNING TO STEADY YOUR NERVES TO GET RID OF A HANGOVER: NO
HAVE YOU EVER FELT YOU SHOULD CUT DOWN ON YOUR DRINKING: NO
TOTAL SCORE: 0
HAVE PEOPLE ANNOYED YOU BY CRITICIZING YOUR DRINKING: NO

## 2025-06-12 ASSESSMENT — PAIN SCALES - GENERAL: PAINLEVEL_OUTOF10: 0 - NO PAIN

## 2025-06-12 ASSESSMENT — PAIN SCALES - WONG BAKER: WONGBAKER_NUMERICALRESPONSE: HURTS LITTLE BIT

## 2025-06-12 ASSESSMENT — PAIN DESCRIPTION - LOCATION: LOCATION: HIP

## 2025-06-12 ASSESSMENT — PAIN DESCRIPTION - PAIN TYPE: TYPE: ACUTE PAIN

## 2025-06-12 ASSESSMENT — PAIN - FUNCTIONAL ASSESSMENT: PAIN_FUNCTIONAL_ASSESSMENT: WONG-BAKER FACES

## 2025-06-12 ASSESSMENT — PAIN DESCRIPTION - ORIENTATION: ORIENTATION: RIGHT;LEFT

## 2025-06-12 NOTE — ED PROVIDER NOTES
"Emergency Department Provider Note        History of Present Illness     History provided by: {History Provided By:69005::\"Patient\"}  Limitations to History: {History Limitations:39301::\"None\"}  External Records Reviewed with Brief Summary: {ED External Records Reviewed with Brief Summary:84669::\"None\"}    HPI:  Nguễyn Hernandez is a 74 y.o. male ***    Physical Exam   Triage vitals:  T 36.5 °C (97.7 °F)  HR 72  /59  RR 14  O2 94 % None (Room air)    {ED Physical Exam:08262}    Medical Decision Making & ED Course   Medical Decision Makin y.o. male ***  ----  {Scoring Tools Utilized:20992}    Differential diagnoses considered include but are not limited to: ***     Social Determinants of Health which Significantly Impact Care: {Social Determinants of Health which Significantly Impact Care:38214::\"None identified\"} {The following actions were taken to address these social determinants:67755}    EKG Independent Interpretation: {EKG Independent Interpretation:14170}    Independent Result Review and Interpretation: {Independent Result Review and Interpretation:28854::\"Relevant laboratory and radiographic results were reviewed and independently interpreted by myself.  As necessary, they are commented on in the ED Course.\"}    Chronic conditions affecting the patient's care: {Chronic conditions affecting the patient's care:92119::\"As documented above in MDM\"}    The patient was discussed with the following consultants/services: {The patient was discussed with the following consultants/services:37542::\"None\"}    Care Considerations: {Care Considerations:11021::\"As documented above in MDM\"}    ED Course:     Disposition   {ED Disposition:29692}    Procedures   Procedures    {ED Provider Level (Optional):90508}    Del Amaya, DO  Emergency Medicine      "

## 2025-06-12 NOTE — ED PROVIDER NOTES
EMERGENCY DEPARTMENT ENCOUNTER      Pt Name: Nguyễn Hernandez  MRN: [id number]  Birthdate 1950  Date of evaluation: 6/12/2025  Provider: Gerhard Griffin DO    CHIEF COMPLAINT       Chief Complaint   Patient presents with    Weakness, Gen     Pt complains of weakness and bilateral hip pain for several days. No fall or recent injuries. Pt is confused at baseline and is currently at his baseline.          HISTORY OF PRESENT ILLNESS    74-year-old male history of CVA of the right MCA, hypertension, TIA, hypothyroid, hyperlipidemia, comes to the emergency room, sent in by facility for generalized weakness.  Patient reports around 1:00 yesterday he had a fall, says he has left hip pain from the fall.  He said the fall was due to his tremors from his parkinsonism.  He said he did hit his head when he fell.  He currently has pain in his left hip but no other associated symptoms.  Patient is a questionable historian at baseline.  Patient is on Plavix per chart review.      History provided by:  Patient, EMS personnel and nursing home      Nursing Notes were reviewed.    PAST MEDICAL HISTORY   Medical History[1]      SURGICAL HISTORY     Surgical History[2]      CURRENT MEDICATIONS       Previous Medications    AMLODIPINE (NORVASC) 10 MG TABLET    Take 1 tablet (10 mg) by mouth once daily.    ASPIRIN 81 MG EC TABLET    Take 1 tablet (81 mg) by mouth once daily.    ATORVASTATIN (LIPITOR) 80 MG TABLET    Take 1 tablet (80 mg) by mouth once daily.    CARBIDOPA-LEVODOPA (SINEMET)  MG TABLET    Take 1 tablet by mouth 3 times a day.    CELECOXIB (CELEBREX) 100 MG CAPSULE    1 capsule (100 mg) once daily.    CITALOPRAM (CELEXA) 20 MG TABLET    1 tablet (20 mg).    CLOPIDOGREL (PLAVIX) 75 MG TABLET    Take 1 tablet (75 mg) by mouth once daily.    DIVALPROEX (DEPAKOTE) 125 MG EC TABLET    1 tablet (125 mg). 3 tabs BID    GABAPENTIN (NEURONTIN) 100 MG CAPSULE    Take 1 capsule (100 mg) by mouth 3 times a day.    HYDROCORTISONE  2.5 % CREAM        LEVOTHYROXINE (SYNTHROID, LEVOXYL) 50 MCG TABLET    Take by mouth.    MAGNESIUM HYDROXIDE (MILK OF MAGNESIA) 400 MG/5 ML SUSPENSION    Take by mouth once daily as needed for constipation.    MELATONIN 3 MG CAPSULE    Take by mouth.    METHYLPREDNISOLONE (MEDROL DOSPAK) 4 MG TABLETS    Use as directed by package instructions    PANTOPRAZOLE (PROTONIX) 40 MG EC TABLET    1 tablet (40 mg) once daily in the morning. Take before meals.    THIAMINE 100 MG TABLET    Take 1 tablet (100 mg) by mouth.    TIZANIDINE (ZANAFLEX) 4 MG TABLET    Take 0.5 tablets (2 mg) by mouth as needed at bedtime for muscle spasms for up to 14 days.    TRAZODONE (DESYREL) 100 MG TABLET    1 tablet (100 mg) once daily at bedtime.       ALLERGIES     Ace inhibitors, Morphine, Sulfamethoxazole-trimethoprim, Benzodiazepines, Diphenhydramine, Fentanyl, Haloperidol, Ketoconazole, Acetaminophen, Amoxicillin, Azithromycin, Latex, and Oxybutynin    FAMILY HISTORY     Family History[3]       SOCIAL HISTORY     Social History[4]    SCREENINGS                        PHYSICAL EXAM    (up to 7 for level 4, 8 or more for level 5)     ED Triage Vitals [06/12/25 1343]   Temperature Heart Rate Respirations BP   36.5 °C (97.7 °F) 72 14 125/59      Pulse Ox Temp Source Heart Rate Source Patient Position   94 % Temporal Monitor Sitting      BP Location FiO2 (%)     Right arm --       Physical Exam  Vitals and nursing note reviewed.   Constitutional:       General: He is not in acute distress.  HENT:      Head: Normocephalic and atraumatic.      Comments: No external signs of head trauma, no cephalohematomas or depressed skull fracture  Eyes:      General: No scleral icterus.        Right eye: No discharge.         Left eye: No discharge.      Conjunctiva/sclera: Conjunctivae normal.      Pupils: Pupils are equal, round, and reactive to light.   Cardiovascular:      Rate and Rhythm: Normal rate and regular rhythm.      Pulses: Normal pulses.    Pulmonary:      Effort: Pulmonary effort is normal.   Abdominal:      General: Abdomen is flat.      Palpations: Abdomen is soft.      Tenderness: There is no abdominal tenderness. There is no guarding or rebound.   Musculoskeletal:         General: No deformity.      Right lower leg: No edema.      Left lower leg: No edema.      Comments: No pain on internal or external rotation of either the lower extremities.  He is able to flex and extend his left knee fully as well.  He is some tenderness over his left greater trochanter however.   Skin:     General: Skin is warm and dry.   Neurological:      Mental Status: He is alert and oriented to person, place, and time. Mental status is at baseline.   Psychiatric:         Mood and Affect: Mood normal.         Behavior: Behavior normal.          DIAGNOSTIC RESULTS     LABS:  Labs Reviewed   CBC WITH AUTO DIFFERENTIAL - Abnormal       Result Value    WBC 7.0      nRBC 0.0      RBC 4.49 (*)     Hemoglobin 13.1 (*)     Hematocrit 41.1      MCV 92      MCH 29.2      MCHC 31.9 (*)     RDW 18.0 (*)     Platelets 235      Neutrophils % 63.9      Immature Granulocytes %, Automated 0.4      Lymphocytes % 23.9      Monocytes % 10.8      Eosinophils % 0.9      Basophils % 0.1      Neutrophils Absolute 4.49      Immature Granulocytes Absolute, Automated 0.03      Lymphocytes Absolute 1.68      Monocytes Absolute 0.76      Eosinophils Absolute 0.06      Basophils Absolute 0.01     COMPREHENSIVE METABOLIC PANEL - Abnormal    Glucose 105 (*)     Sodium 138      Potassium 4.0      Chloride 99      Bicarbonate 31      Anion Gap 12      Urea Nitrogen 14      Creatinine 0.84      eGFR >90      Calcium 8.9      Albumin 4.3      Alkaline Phosphatase 75      Total Protein 6.7      AST 19      Bilirubin, Total 0.5      ALT 10     SERIAL TROPONIN-INITIAL - Normal    Troponin I, High Sensitivity 6      Narrative:     Less than 99th percentile of normal range cutoff-  Female and children under 18  years old <14 ng/L; Male <21 ng/L: Negative  Repeat testing should be performed if clinically indicated.     Female and children under 18 years old 14-50 ng/L; Male 21-50 ng/L:  Consistent with possible cardiac damage and possible increased clinical   risk. Serial measurements may help to assess extent of myocardial damage.     >50 ng/L: Consistent with cardiac damage, increased clinical risk and  myocardial infarction. Serial measurements may help assess extent of   myocardial damage.      NOTE: Children less than 1 year old may have higher baseline troponin   levels and results should be interpreted in conjunction with the overall   clinical context.     NOTE: Troponin I testing is performed using a different   testing methodology at [facility] than at other   [facility]. Direct result comparisons should only   be made within the same method.   URINALYSIS WITH REFLEX CULTURE AND MICROSCOPIC - Normal    Color, Urine Yellow      Appearance, Urine Clear      Specific Gravity, Urine 1.021      pH, Urine 6.0      Protein, Urine NEGATIVE      Glucose, Urine Normal      Blood, Urine NEGATIVE      Ketones, Urine NEGATIVE      Bilirubin, Urine NEGATIVE      Urobilinogen, Urine Normal      Nitrite, Urine NEGATIVE      Leukocyte Esterase, Urine NEGATIVE     TSH WITH REFLEX TO FREE T4 IF ABNORMAL - Normal    Thyroid Stimulating Hormone 2.03      Narrative:     TSH testing is performed using different testing methodology at [facility] than at other [facility]. Direct result comparisons should only be made within the same method.     SERIAL TROPONIN, 1 HOUR - Normal    Troponin I, High Sensitivity 5      Narrative:     Less than 99th percentile of normal range cutoff-  Female and children under 18 years old <14 ng/L; Male <21 ng/L: Negative  Repeat testing should be performed if clinically indicated.     Female and children under 18 years old 14-50 ng/L; Male 21-50 ng/L:  Consistent with  possible cardiac damage and possible increased clinical   risk. Serial measurements may help to assess extent of myocardial damage.     >50 ng/L: Consistent with cardiac damage, increased clinical risk and  myocardial infarction. Serial measurements may help assess extent of   myocardial damage.      NOTE: Children less than 1 year old may have higher baseline troponin   levels and results should be interpreted in conjunction with the overall   clinical context.     NOTE: Troponin I testing is performed using a different   testing methodology at [facility] than at other   [facility]. Direct result comparisons should only   be made within the same method.   TROPONIN SERIES- (INITIAL, 1 HR)    Narrative:     The following orders were created for panel order Troponin I Series, High Sensitivity (0, 1 HR).  Procedure                               Abnormality         Status                     ---------                               -----------         ------                     Troponin I, High Sensiti...[[id number]]  Normal              Final result               Troponin, High Sensitivi...[[id number]]  Normal              Final result                 Please view results for these tests on the individual orders.   URINALYSIS WITH REFLEX CULTURE AND MICROSCOPIC    Narrative:     The following orders were created for panel order Urinalysis with Reflex Culture and Microscopic.  Procedure                               Abnormality         Status                     ---------                               -----------         ------                     Urinalysis with Reflex C...[[id number]]  Normal              Final result               Extra Urine Gray Tube[[id number]]                            In process                   Please view results for these tests on the individual orders.   EXTRA URINE GRAY TUBE       All other labs were within normal range or not returned as of this dictation.    Imaging  CT  head wo IV contrast   Final Result   No acute cortical infarct or acute intracranial hemorrhage. Suspected   chronic right MCA territory infarct and moderate chronic small-vessel   ischemic change.        No evidence of cervical spine fracture or acute traumatic   malalignment. Cervical spondylosis with moderate bilateral foraminal   stenosis at C3-C4 and C4-C5.        Signed by: Herbie Lei 6/12/2025 6:17 PM   Dictation workstation:   [id number]      CT cervical spine wo IV contrast   Final Result   No acute cortical infarct or acute intracranial hemorrhage. Suspected   chronic right MCA territory infarct and moderate chronic small-vessel   ischemic change.        No evidence of cervical spine fracture or acute traumatic   malalignment. Cervical spondylosis with moderate bilateral foraminal   stenosis at C3-C4 and C4-C5.        Signed by: Herbie Lei 6/12/2025 6:17 PM   Dictation workstation:   [id number]      XR chest 1 view   Final Result   No acute pathologic findings are identified.        MACRO:   none        Signed by: Asael Rodriguez 6/12/2025 2:43 PM   Dictation workstation:   [id number]      XR hip left with pelvis when performed 2 or 3 views   Final Result   No acute pathologic findings are identified.   Remote ununited fracture fragments adjacent to the greater trochanter   of the left hip as noted above.        MACRO:   none        Signed by: Asael Rodriguez 6/12/2025 2:46 PM   Dictation workstation:   [id number]           Procedures  Procedures     EMERGENCY DEPARTMENT COURSE/MDM:     Diagnoses as of 06/12/25 1933   Generalized weakness        Medical Decision Making  74-year-old male comes emergency room with weakness, fall yesterday.  Fall happened over 24 hours ago, despite him being on Plavix and endorsing head injury, on.  Head injury on anticoagulant limited trauma protocol given we are out of the 24-hour window.  There are CBC, chemistry, urinalysis, TSH, cardiac enzymes, CT head,  C-spine, x-ray of the left hip.  Differentials for pneumonia, pneumothorax, dehydration, electrolyte derangements, LEVAR, UTI, pneumonia, intracranial hemorrhage here today.      On my independent review of labs, CBC shows normal white count, essentially normal hemoglobin, normal platelets chemistry is normal electrolytes, kidney liver function.  Urinalysis negative for UTI, troponins negative x 2.  TSH within normal limits, CT head shows no intracranial hemorrhage or mass effect, CT spine is unremarkable, hip x-ray on the left shows no acute process, no signs of pneumonia or pneumothorax on my review the chest x-ray, radiology did corroborate this as well.  Patient was discharged at this time, given he is already a resident at the skilled nursing facility.  He will return for any new, returning worsening symptoms.        Patient and or family in agreement and understanding of treatment plan.  All questions answered.      I reviewed the case with the attending ED physician. The attending ED physician agrees with the plan. Patient and/or patient´s representative was counseled regarding labs, imaging, likely diagnosis, and plan. All questions were answered.    ED Medications administered this visit:  Medications - No data to display    New Prescriptions from this visit:    New Prescriptions    No medications on file       Follow-up:  Sera Alexis MD  [address] [place] [address]  [phone]    In 1 day          Final Impression:   1. Generalized weakness          (Please note that portions of this note were completed with a voice recognition program.  Efforts were made to edit the dictations but occasionally words are mis-transcribed.)    The patient was seen by the resident/fellow.  I have personally performed a substantive portion of the encounter.  I have seen and examined the patient; agree with the workup, evaluation, MDM,   management and diagnosis.  The care plan has been discussed with the resident;  I have reviewed the resident’s note and agree with the documented findings.      This is a 74 y.o. male to the ER for concern of generalized weakness and confusion.  Support patient is confusion at baseline.  As per the patient a fall around 1:00 yesterday.  Is unclear if he hit his head.  Is complaining some left hip pain.  He states he was trying to get out of bed when he fell.  Patient does not seem to be the most reliable historian.  Patient does have a history of hypertension and hyperlipidemia.  He does have a history of Parkinson's.  Does have a history of right sided MCA infarct.  He is on Plavix.    Does have a significant resting tremor in his right arm.  Heart is regular.  Lungs are clear.  Abdomen is soft and nontender.  There is mild tenderness palpation of the left hip.  There is no significant pain with logroll.  Peripheral pulses intact.    EKG: Sinus rhythm with a ventricular rate of 70.  KS first-degree AV block KS interval of 216.  QTc 406.  No ST changes.    CMP unremarkable.  CBC unremarkable.  Troponin negative.  TSH normal.       [1]   Past Medical History:  Diagnosis Date    Parkinson's disease (tremor, stiffness, slow motion, unstable posture) (Multi)     Personal history of other diseases of the circulatory system     History of hypertension    Personal history of transient ischemic attack (TIA), and cerebral infarction without residual deficits     History of stroke   [2]   Past Surgical History:  Procedure Laterality Date    OTHER SURGICAL HISTORY  05/26/2022    Knee replacement   [3] No family history on file.  [4]   Social History  Socioeconomic History    Marital status: Single   Tobacco Use    Smoking status: Former     Types: Cigarettes    Smokeless tobacco: Never   Vaping Use    Vaping status: Never Used   Substance and Sexual Activity    Alcohol use: Never    Drug use: Never        Gerhard Griffin DO  Resident  06/12/25 1933

## 2025-06-12 NOTE — DISCHARGE INSTRUCTIONS
Seek immediate medical attention if you develop: worsening weakness, chest pain, nausea, vomiting, weakness, numbness, tingling, excessive sweating, shortness of breath, difficulty breathing, loss of motion in your arms or legs, or any new or worsening symptoms.

## 2025-06-13 LAB — HOLD SPECIMEN: NORMAL

## 2025-06-16 LAB — HOLD SPECIMEN: NORMAL

## 2025-06-26 ENCOUNTER — APPOINTMENT (OUTPATIENT)
Dept: RADIOLOGY | Facility: HOSPITAL | Age: 75
End: 2025-06-26
Payer: COMMERCIAL

## 2025-06-26 ENCOUNTER — APPOINTMENT (OUTPATIENT)
Dept: RADIOLOGY | Facility: CLINIC | Age: 75
End: 2025-06-26
Payer: COMMERCIAL

## 2025-06-29 ENCOUNTER — APPOINTMENT (OUTPATIENT)
Dept: RADIOLOGY | Facility: HOSPITAL | Age: 75
End: 2025-06-29
Payer: COMMERCIAL

## 2025-06-29 ENCOUNTER — APPOINTMENT (OUTPATIENT)
Dept: CARDIOLOGY | Facility: HOSPITAL | Age: 75
End: 2025-06-29
Payer: COMMERCIAL

## 2025-06-29 ENCOUNTER — HOSPITAL ENCOUNTER (EMERGENCY)
Facility: HOSPITAL | Age: 75
Discharge: PSYCHIATRIC OR SUBSTANCE USE FACILITY | End: 2025-07-01
Attending: EMERGENCY MEDICINE
Payer: COMMERCIAL

## 2025-06-29 DIAGNOSIS — R45.850 HOMICIDAL BEHAVIOR: Primary | ICD-10-CM

## 2025-06-29 DIAGNOSIS — R45.851 SUICIDAL IDEATION: ICD-10-CM

## 2025-06-29 LAB
ALBUMIN SERPL BCP-MCNC: 4.2 G/DL (ref 3.4–5)
ALP SERPL-CCNC: 75 U/L (ref 33–136)
ALT SERPL W P-5'-P-CCNC: 15 U/L (ref 10–52)
ANION GAP SERPL CALC-SCNC: 12 MMOL/L (ref 10–20)
APAP SERPL-MCNC: <10 UG/ML (ref ?–30)
AST SERPL W P-5'-P-CCNC: 19 U/L (ref 9–39)
BASOPHILS # BLD AUTO: 0.02 X10*3/UL (ref 0–0.1)
BASOPHILS NFR BLD AUTO: 0.3 %
BILIRUB SERPL-MCNC: 0.6 MG/DL (ref 0–1.2)
BNP SERPL-MCNC: 53 PG/ML (ref 0–99)
BUN SERPL-MCNC: 22 MG/DL (ref 6–23)
CALCIUM SERPL-MCNC: 8.7 MG/DL (ref 8.6–10.3)
CARDIAC TROPONIN I PNL SERPL HS: 6 NG/L (ref 0–20)
CARDIAC TROPONIN I PNL SERPL HS: 6 NG/L (ref 0–20)
CHLORIDE SERPL-SCNC: 99 MMOL/L (ref 98–107)
CO2 SERPL-SCNC: 27 MMOL/L (ref 21–32)
CREAT SERPL-MCNC: 0.76 MG/DL (ref 0.5–1.3)
EGFRCR SERPLBLD CKD-EPI 2021: >90 ML/MIN/1.73M*2
EOSINOPHIL # BLD AUTO: 0.12 X10*3/UL (ref 0–0.4)
EOSINOPHIL NFR BLD AUTO: 1.7 %
ERYTHROCYTE [DISTWIDTH] IN BLOOD BY AUTOMATED COUNT: 19 % (ref 11.5–14.5)
ETHANOL SERPL-MCNC: <10 MG/DL
GLUCOSE SERPL-MCNC: 102 MG/DL (ref 74–99)
HCT VFR BLD AUTO: 39 % (ref 41–52)
HGB BLD-MCNC: 12.9 G/DL (ref 13.5–17.5)
IMM GRANULOCYTES # BLD AUTO: 0.04 X10*3/UL (ref 0–0.5)
IMM GRANULOCYTES NFR BLD AUTO: 0.6 % (ref 0–0.9)
LYMPHOCYTES # BLD AUTO: 1.35 X10*3/UL (ref 0.8–3)
LYMPHOCYTES NFR BLD AUTO: 18.6 %
MCH RBC QN AUTO: 29.4 PG (ref 26–34)
MCHC RBC AUTO-ENTMCNC: 33.1 G/DL (ref 32–36)
MCV RBC AUTO: 89 FL (ref 80–100)
MONOCYTES # BLD AUTO: 0.93 X10*3/UL (ref 0.05–0.8)
MONOCYTES NFR BLD AUTO: 12.8 %
NEUTROPHILS # BLD AUTO: 4.81 X10*3/UL (ref 1.6–5.5)
NEUTROPHILS NFR BLD AUTO: 66 %
NRBC BLD-RTO: 0 /100 WBCS (ref 0–0)
PLATELET # BLD AUTO: 225 X10*3/UL (ref 150–450)
POTASSIUM SERPL-SCNC: 3.6 MMOL/L (ref 3.5–5.3)
PROT SERPL-MCNC: 6.5 G/DL (ref 6.4–8.2)
RBC # BLD AUTO: 4.39 X10*6/UL (ref 4.5–5.9)
SALICYLATES SERPL-MCNC: <3 MG/DL (ref ?–20)
SODIUM SERPL-SCNC: 134 MMOL/L (ref 136–145)
WBC # BLD AUTO: 7.3 X10*3/UL (ref 4.4–11.3)

## 2025-06-29 PROCEDURE — 84484 ASSAY OF TROPONIN QUANT: CPT

## 2025-06-29 PROCEDURE — 36415 COLL VENOUS BLD VENIPUNCTURE: CPT

## 2025-06-29 PROCEDURE — 2500000001 HC RX 250 WO HCPCS SELF ADMINISTERED DRUGS (ALT 637 FOR MEDICARE OP)

## 2025-06-29 PROCEDURE — 99285 EMERGENCY DEPT VISIT HI MDM: CPT

## 2025-06-29 PROCEDURE — 74177 CT ABD & PELVIS W/CONTRAST: CPT

## 2025-06-29 PROCEDURE — 99285 EMERGENCY DEPT VISIT HI MDM: CPT | Mod: 25 | Performed by: EMERGENCY MEDICINE

## 2025-06-29 PROCEDURE — 85025 COMPLETE CBC W/AUTO DIFF WBC: CPT

## 2025-06-29 PROCEDURE — 81003 URINALYSIS AUTO W/O SCOPE: CPT

## 2025-06-29 PROCEDURE — 80307 DRUG TEST PRSMV CHEM ANLYZR: CPT

## 2025-06-29 PROCEDURE — 71045 X-RAY EXAM CHEST 1 VIEW: CPT

## 2025-06-29 PROCEDURE — 80053 COMPREHEN METABOLIC PANEL: CPT

## 2025-06-29 PROCEDURE — 83880 ASSAY OF NATRIURETIC PEPTIDE: CPT

## 2025-06-29 PROCEDURE — 93005 ELECTROCARDIOGRAM TRACING: CPT

## 2025-06-29 PROCEDURE — 2500000002 HC RX 250 W HCPCS SELF ADMINISTERED DRUGS (ALT 637 FOR MEDICARE OP, ALT 636 FOR OP/ED)

## 2025-06-29 PROCEDURE — 2550000001 HC RX 255 CONTRASTS: Performed by: EMERGENCY MEDICINE

## 2025-06-29 PROCEDURE — 71045 X-RAY EXAM CHEST 1 VIEW: CPT | Mod: FOREIGN READ | Performed by: RADIOLOGY

## 2025-06-29 PROCEDURE — 80143 DRUG ASSAY ACETAMINOPHEN: CPT

## 2025-06-29 PROCEDURE — 74177 CT ABD & PELVIS W/CONTRAST: CPT | Mod: FOREIGN READ | Performed by: RADIOLOGY

## 2025-06-29 RX ORDER — CELECOXIB 100 MG/1
100 CAPSULE ORAL DAILY
Status: DISCONTINUED | OUTPATIENT
Start: 2025-06-30 | End: 2025-07-01 | Stop reason: HOSPADM

## 2025-06-29 RX ORDER — ASPIRIN 81 MG/1
81 TABLET ORAL DAILY
Status: DISCONTINUED | OUTPATIENT
Start: 2025-06-30 | End: 2025-07-01 | Stop reason: HOSPADM

## 2025-06-29 RX ORDER — LEVOTHYROXINE SODIUM 50 UG/1
50 TABLET ORAL DAILY
Status: DISCONTINUED | OUTPATIENT
Start: 2025-06-30 | End: 2025-07-01 | Stop reason: HOSPADM

## 2025-06-29 RX ORDER — AMLODIPINE BESYLATE 10 MG/1
10 TABLET ORAL DAILY
Status: DISCONTINUED | OUTPATIENT
Start: 2025-06-30 | End: 2025-07-01 | Stop reason: HOSPADM

## 2025-06-29 RX ORDER — DIVALPROEX SODIUM 500 MG/1
500 TABLET, DELAYED RELEASE ORAL EVERY 12 HOURS SCHEDULED
Status: DISCONTINUED | OUTPATIENT
Start: 2025-06-29 | End: 2025-07-01 | Stop reason: HOSPADM

## 2025-06-29 RX ORDER — TRAZODONE HYDROCHLORIDE 100 MG/1
100 TABLET ORAL NIGHTLY
Status: DISCONTINUED | OUTPATIENT
Start: 2025-06-29 | End: 2025-07-01 | Stop reason: HOSPADM

## 2025-06-29 RX ORDER — PANTOPRAZOLE SODIUM 40 MG/1
40 TABLET, DELAYED RELEASE ORAL
Status: DISCONTINUED | OUTPATIENT
Start: 2025-06-30 | End: 2025-07-01 | Stop reason: HOSPADM

## 2025-06-29 RX ORDER — CLOPIDOGREL BISULFATE 75 MG/1
75 TABLET ORAL DAILY
Status: DISCONTINUED | OUTPATIENT
Start: 2025-06-30 | End: 2025-07-01 | Stop reason: HOSPADM

## 2025-06-29 RX ORDER — ATORVASTATIN CALCIUM 80 MG/1
80 TABLET, FILM COATED ORAL DAILY
Status: DISCONTINUED | OUTPATIENT
Start: 2025-06-30 | End: 2025-07-01 | Stop reason: HOSPADM

## 2025-06-29 RX ORDER — DIVALPROEX SODIUM 125 MG/1
125 TABLET, DELAYED RELEASE ORAL EVERY 12 HOURS SCHEDULED
Status: DISCONTINUED | OUTPATIENT
Start: 2025-06-29 | End: 2025-06-29

## 2025-06-29 RX ORDER — CARBIDOPA AND LEVODOPA 25; 100 MG/1; MG/1
1 TABLET ORAL 3 TIMES DAILY
Status: DISCONTINUED | OUTPATIENT
Start: 2025-06-29 | End: 2025-06-30

## 2025-06-29 RX ORDER — OLANZAPINE 5 MG/1
10 TABLET, ORALLY DISINTEGRATING ORAL ONCE AS NEEDED
Status: COMPLETED | OUTPATIENT
Start: 2025-06-29 | End: 2025-06-29

## 2025-06-29 RX ORDER — GABAPENTIN 100 MG/1
100 CAPSULE ORAL 3 TIMES DAILY
Status: DISCONTINUED | OUTPATIENT
Start: 2025-06-29 | End: 2025-07-01 | Stop reason: HOSPADM

## 2025-06-29 RX ADMIN — OLANZAPINE 10 MG: 5 TABLET, ORALLY DISINTEGRATING ORAL at 22:04

## 2025-06-29 RX ADMIN — TRAZODONE HYDROCHLORIDE 100 MG: 100 TABLET ORAL at 22:03

## 2025-06-29 RX ADMIN — DIVALPROEX SODIUM 500 MG: 500 TABLET, DELAYED RELEASE ORAL at 22:42

## 2025-06-29 RX ADMIN — CARBIDOPA AND LEVODOPA 1 TABLET: 25; 100 TABLET ORAL at 22:03

## 2025-06-29 RX ADMIN — IOHEXOL 75 ML: 350 INJECTION, SOLUTION INTRAVENOUS at 22:16

## 2025-06-29 RX ADMIN — GABAPENTIN 100 MG: 100 CAPSULE ORAL at 22:03

## 2025-06-29 SDOH — HEALTH STABILITY: MENTAL HEALTH: DELUSIONS: CONTROLLED

## 2025-06-29 SDOH — HEALTH STABILITY: MENTAL HEALTH: BEHAVIORS/MOOD: ANXIOUS;COOPERATIVE

## 2025-06-29 SDOH — HEALTH STABILITY: MENTAL HEALTH: BEHAVIORS/MOOD: AGITATED;COOPERATIVE

## 2025-06-29 SDOH — HEALTH STABILITY: MENTAL HEALTH: BEHAVIORAL HEALTH(WDL): EXCEPTIONS TO WDL

## 2025-06-29 SDOH — HEALTH STABILITY: MENTAL HEALTH: HALLUCINATION: UNABLE TO ASSESS

## 2025-06-29 SDOH — HEALTH STABILITY: MENTAL HEALTH: SLEEP PATTERN: DISTURBED/INTERRUPTED SLEEP

## 2025-06-29 SDOH — HEALTH STABILITY: MENTAL HEALTH: CONTENT: UNREMARKABLE

## 2025-06-29 SDOH — HEALTH STABILITY: MENTAL HEALTH: SUICIDE ASSESSMENT: ADULT (C-SSRS)

## 2025-06-29 ASSESSMENT — LIFESTYLE VARIABLES
EVER FELT BAD OR GUILTY ABOUT YOUR DRINKING: NO
HAVE YOU EVER FELT YOU SHOULD CUT DOWN ON YOUR DRINKING: NO
EVER HAD A DRINK FIRST THING IN THE MORNING TO STEADY YOUR NERVES TO GET RID OF A HANGOVER: NO
TOTAL SCORE: 0
HAVE PEOPLE ANNOYED YOU BY CRITICIZING YOUR DRINKING: NO

## 2025-06-29 ASSESSMENT — PAIN SCALES - GENERAL: PAINLEVEL_OUTOF10: 0 - NO PAIN

## 2025-06-29 ASSESSMENT — PAIN - FUNCTIONAL ASSESSMENT: PAIN_FUNCTIONAL_ASSESSMENT: 0-10
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
## 2025-06-30 ENCOUNTER — APPOINTMENT (OUTPATIENT)
Dept: CARDIOLOGY | Facility: HOSPITAL | Age: 75
End: 2025-06-30
Payer: COMMERCIAL

## 2025-06-30 LAB
AMPHETAMINES UR QL SCN: NORMAL
APPEARANCE UR: CLEAR
BARBITURATES UR QL SCN: NORMAL
BENZODIAZ UR QL SCN: NORMAL
BILIRUB UR STRIP.AUTO-MCNC: NEGATIVE MG/DL
BZE UR QL SCN: NORMAL
CANNABINOIDS UR QL SCN: NORMAL
COLOR UR: YELLOW
FENTANYL+NORFENTANYL UR QL SCN: NORMAL
GLUCOSE UR STRIP.AUTO-MCNC: NORMAL MG/DL
HOLD SPECIMEN: NORMAL
KETONES UR STRIP.AUTO-MCNC: NEGATIVE MG/DL
LEUKOCYTE ESTERASE UR QL STRIP.AUTO: NEGATIVE
METHADONE UR QL SCN: NORMAL
NITRITE UR QL STRIP.AUTO: NEGATIVE
OPIATES UR QL SCN: NORMAL
OXYCODONE+OXYMORPHONE UR QL SCN: NORMAL
PCP UR QL SCN: NORMAL
PH UR STRIP.AUTO: 6.5 [PH]
PROT UR STRIP.AUTO-MCNC: NEGATIVE MG/DL
RBC # UR STRIP.AUTO: NEGATIVE MG/DL
SARS-COV-2 RNA RESP QL NAA+PROBE: NOT DETECTED
SP GR UR STRIP.AUTO: >1.05
UROBILINOGEN UR STRIP.AUTO-MCNC: ABNORMAL MG/DL

## 2025-06-30 PROCEDURE — 96372 THER/PROPH/DIAG INJ SC/IM: CPT

## 2025-06-30 PROCEDURE — 93005 ELECTROCARDIOGRAM TRACING: CPT

## 2025-06-30 PROCEDURE — 2500000002 HC RX 250 W HCPCS SELF ADMINISTERED DRUGS (ALT 637 FOR MEDICARE OP, ALT 636 FOR OP/ED)

## 2025-06-30 PROCEDURE — 2500000004 HC RX 250 GENERAL PHARMACY W/ HCPCS (ALT 636 FOR OP/ED)

## 2025-06-30 PROCEDURE — 87635 SARS-COV-2 COVID-19 AMP PRB: CPT | Performed by: EMERGENCY MEDICINE

## 2025-06-30 PROCEDURE — 2500000002 HC RX 250 W HCPCS SELF ADMINISTERED DRUGS (ALT 637 FOR MEDICARE OP, ALT 636 FOR OP/ED): Performed by: STUDENT IN AN ORGANIZED HEALTH CARE EDUCATION/TRAINING PROGRAM

## 2025-06-30 PROCEDURE — 2500000001 HC RX 250 WO HCPCS SELF ADMINISTERED DRUGS (ALT 637 FOR MEDICARE OP)

## 2025-06-30 PROCEDURE — 2500000001 HC RX 250 WO HCPCS SELF ADMINISTERED DRUGS (ALT 637 FOR MEDICARE OP): Performed by: EMERGENCY MEDICINE

## 2025-06-30 PROCEDURE — 96360 HYDRATION IV INFUSION INIT: CPT | Performed by: EMERGENCY MEDICINE

## 2025-06-30 RX ORDER — CARBIDOPA AND LEVODOPA 25; 100 MG/1; MG/1
1 TABLET ORAL 2 TIMES DAILY
Status: DISCONTINUED | OUTPATIENT
Start: 2025-06-30 | End: 2025-07-01 | Stop reason: HOSPADM

## 2025-06-30 RX ORDER — DEXTROMETHORPHAN POLISTIREX 30 MG/5 ML
1 SUSPENSION, EXTENDED RELEASE 12 HR ORAL DAILY PRN
COMMUNITY

## 2025-06-30 RX ORDER — OLANZAPINE 2.5 MG/1
2.5 TABLET, FILM COATED ORAL EVERY 6 HOURS PRN
Status: DISCONTINUED | OUTPATIENT
Start: 2025-06-30 | End: 2025-07-01 | Stop reason: HOSPADM

## 2025-06-30 RX ORDER — MIDAZOLAM HYDROCHLORIDE 5 MG/ML
5 INJECTION, SOLUTION INTRAMUSCULAR; INTRAVENOUS ONCE
Status: COMPLETED | OUTPATIENT
Start: 2025-06-30 | End: 2025-06-30

## 2025-06-30 RX ORDER — MIDAZOLAM HYDROCHLORIDE 5 MG/ML
INJECTION, SOLUTION INTRAMUSCULAR; INTRAVENOUS
Status: COMPLETED
Start: 2025-06-30 | End: 2025-06-30

## 2025-06-30 RX ORDER — POLYETHYLENE GLYCOL 3350 17 G/17G
17 POWDER, FOR SOLUTION ORAL DAILY PRN
COMMUNITY

## 2025-06-30 RX ORDER — FLUTICASONE PROPIONATE 50 MCG
1 SPRAY, SUSPENSION (ML) NASAL DAILY
COMMUNITY

## 2025-06-30 RX ORDER — BUPROPION HYDROCHLORIDE 150 MG/1
150 TABLET ORAL DAILY
Status: DISCONTINUED | OUTPATIENT
Start: 2025-06-30 | End: 2025-07-01 | Stop reason: HOSPADM

## 2025-06-30 RX ORDER — DICLOFENAC SODIUM 10 MG/G
2 GEL TOPICAL 4 TIMES DAILY PRN
COMMUNITY

## 2025-06-30 RX ORDER — OLANZAPINE 10 MG/2ML
2.5 INJECTION, POWDER, FOR SOLUTION INTRAMUSCULAR EVERY 6 HOURS PRN
Status: DISCONTINUED | OUTPATIENT
Start: 2025-06-30 | End: 2025-07-01 | Stop reason: HOSPADM

## 2025-06-30 RX ORDER — BUPROPION HYDROCHLORIDE 150 MG/1
150 TABLET ORAL EVERY MORNING
COMMUNITY

## 2025-06-30 RX ORDER — LAMOTRIGINE 25-50-100
KIT ORAL
COMMUNITY
Start: 2025-06-21

## 2025-06-30 RX ORDER — SIMETHICONE 80 MG
80 TABLET,CHEWABLE ORAL 3 TIMES DAILY
COMMUNITY

## 2025-06-30 RX ORDER — DOCUSATE SODIUM 100 MG/1
100 CAPSULE, LIQUID FILLED ORAL EVERY MORNING
COMMUNITY

## 2025-06-30 RX ORDER — LAMOTRIGINE 25 MG/1
25 TABLET ORAL DAILY
Status: DISCONTINUED | OUTPATIENT
Start: 2025-06-30 | End: 2025-07-01 | Stop reason: HOSPADM

## 2025-06-30 RX ORDER — BISACODYL 10 MG/1
10 SUPPOSITORY RECTAL DAILY PRN
COMMUNITY

## 2025-06-30 RX ORDER — ACETAMINOPHEN 325 MG/1
650 TABLET ORAL EVERY 4 HOURS PRN
COMMUNITY

## 2025-06-30 RX ADMIN — SODIUM CHLORIDE 1000 ML: 0.9 INJECTION, SOLUTION INTRAVENOUS at 03:07

## 2025-06-30 RX ADMIN — MIDAZOLAM HYDROCHLORIDE 5 MG: 5 INJECTION, SOLUTION INTRAMUSCULAR; INTRAVENOUS at 01:36

## 2025-06-30 RX ADMIN — TRAZODONE HYDROCHLORIDE 100 MG: 100 TABLET ORAL at 21:05

## 2025-06-30 RX ADMIN — BUPROPION HYDROCHLORIDE 150 MG: 150 TABLET, EXTENDED RELEASE ORAL at 14:32

## 2025-06-30 RX ADMIN — MIDAZOLAM HYDROCHLORIDE 5 MG: 5 INJECTION, SOLUTION INTRAMUSCULAR; INTRAVENOUS at 23:17

## 2025-06-30 RX ADMIN — CELECOXIB 100 MG: 100 CAPSULE ORAL at 08:56

## 2025-06-30 RX ADMIN — CARBIDOPA AND LEVODOPA 1 TABLET: 25; 100 TABLET ORAL at 21:05

## 2025-06-30 RX ADMIN — CARBIDOPA AND LEVODOPA 1 TABLET: 25; 100 TABLET ORAL at 08:56

## 2025-06-30 RX ADMIN — AMLODIPINE BESYLATE 10 MG: 10 TABLET ORAL at 08:56

## 2025-06-30 RX ADMIN — DIVALPROEX SODIUM 500 MG: 500 TABLET, DELAYED RELEASE ORAL at 21:06

## 2025-06-30 RX ADMIN — CLOPIDOGREL 75 MG: 75 TABLET ORAL at 08:56

## 2025-06-30 RX ADMIN — OLANZAPINE 2.5 MG: 2.5 TABLET, FILM COATED ORAL at 19:13

## 2025-06-30 RX ADMIN — OLANZAPINE 2.5 MG: 2.5 TABLET, FILM COATED ORAL at 07:11

## 2025-06-30 RX ADMIN — LAMOTRIGINE 25 MG: 25 TABLET ORAL at 14:32

## 2025-06-30 RX ADMIN — LEVOTHYROXINE SODIUM 50 MCG: 0.05 TABLET ORAL at 06:58

## 2025-06-30 RX ADMIN — ATORVASTATIN CALCIUM 80 MG: 80 TABLET, FILM COATED ORAL at 08:56

## 2025-06-30 RX ADMIN — PANTOPRAZOLE SODIUM 40 MG: 40 TABLET, DELAYED RELEASE ORAL at 06:58

## 2025-06-30 RX ADMIN — GABAPENTIN 100 MG: 100 CAPSULE ORAL at 21:06

## 2025-06-30 RX ADMIN — DIVALPROEX SODIUM 500 MG: 500 TABLET, DELAYED RELEASE ORAL at 08:57

## 2025-06-30 RX ADMIN — GABAPENTIN 100 MG: 100 CAPSULE ORAL at 14:31

## 2025-06-30 RX ADMIN — ASPIRIN 81 MG: 81 TABLET, COATED ORAL at 08:56

## 2025-06-30 RX ADMIN — GABAPENTIN 100 MG: 100 CAPSULE ORAL at 06:58

## 2025-06-30 RX ADMIN — OLANZAPINE 2.5 MG: 10 INJECTION, POWDER, FOR SOLUTION INTRAMUSCULAR at 19:20

## 2025-06-30 SDOH — HEALTH STABILITY: MENTAL HEALTH: HAVE YOU ACTUALLY HAD ANY THOUGHTS OF KILLING YOURSELF?: NO

## 2025-06-30 SDOH — HEALTH STABILITY: MENTAL HEALTH: WISH TO BE DEAD (PAST 1 MONTH): NO

## 2025-06-30 SDOH — HEALTH STABILITY: MENTAL HEALTH: HOW DID YOU TRY TO KILL YOURSELF?: OVERDOSE

## 2025-06-30 SDOH — SOCIAL STABILITY: SOCIAL NETWORK: PARENT/GUARDIAN/SIGNIFICANT OTHER INVOLVEMENT: NO INVOLVEMENT

## 2025-06-30 SDOH — HEALTH STABILITY: MENTAL HEALTH: SUICIDE ASSESSMENT: ADULT (C-SSRS)

## 2025-06-30 SDOH — HEALTH STABILITY: MENTAL HEALTH: BEHAVIORS/MOOD: CALM

## 2025-06-30 SDOH — HEALTH STABILITY: MENTAL HEALTH: WHEN DID YOU TRY TO KILL YOURSELF?: 03/2024

## 2025-06-30 SDOH — HEALTH STABILITY: MENTAL HEALTH

## 2025-06-30 SDOH — HEALTH STABILITY: MENTAL HEALTH: SUICIDAL BEHAVIOR (LIFETIME): YES

## 2025-06-30 SDOH — HEALTH STABILITY: MENTAL HEALTH: IN THE PAST FEW WEEKS, HAVE YOU FELT THAT YOU OR YOUR FAMILY WOULD BE BETTER OFF IF YOU WERE DEAD?: NO

## 2025-06-30 SDOH — HEALTH STABILITY: MENTAL HEALTH: HAVE YOU WISHED YOU WERE DEAD OR WISHED YOU COULD GO TO SLEEP AND NOT WAKE UP?: NO

## 2025-06-30 SDOH — HEALTH STABILITY: MENTAL HEALTH: BEHAVIORS/MOOD: SLEEPING

## 2025-06-30 SDOH — SOCIAL STABILITY: SOCIAL INSECURITY: FAMILY BEHAVIORS: UNABLE TO ASSESS

## 2025-06-30 SDOH — HEALTH STABILITY: MENTAL HEALTH: ARE YOU HAVING THOUGHTS OF KILLING YOURSELF RIGHT NOW?: NO

## 2025-06-30 SDOH — HEALTH STABILITY: MENTAL HEALTH: HAVE YOU EVER TRIED TO KILL YOURSELF?: YES

## 2025-06-30 SDOH — HEALTH STABILITY: MENTAL HEALTH: IN THE PAST FEW WEEKS, HAVE YOU WISHED YOU WERE DEAD?: NO

## 2025-06-30 SDOH — HEALTH STABILITY: MENTAL HEALTH
OTHER SUICIDE PRECAUTIONS INCLUDE: PATIENT PLACED IN AN EASILY OBSERVABLE ROOM WITH DOOR/CURTAIN REMAINING OPEN;PATIENT PLACED IN GOWN (SNAPS OR PAPER GOWNS PREFERRED) AND WANDED;REMAINING RISKS IDENTIFIED AND MITIGATED;PATIENT PLACED IN PSYCH SAFE ROOM (IF AVAILABLE);PROVIDER NOTIFIED;ELOPEMENT RISK IDENTIFIED

## 2025-06-30 SDOH — HEALTH STABILITY: MENTAL HEALTH: BEHAVIORAL HEALTH(WDL): EXCEPTIONS TO WDL

## 2025-06-30 SDOH — HEALTH STABILITY: MENTAL HEALTH: ACTIVE SUICIDAL IDEATION WITH SPECIFIC PLAN AND INTENT (PAST 1 MONTH): NO

## 2025-06-30 SDOH — HEALTH STABILITY: MENTAL HEALTH: SUICIDAL BEHAVIOR (3 MONTHS): NO

## 2025-06-30 SDOH — HEALTH STABILITY: MENTAL HEALTH: ACTIVE SUICIDAL IDEATION WITH SOME INTENT TO ACT, WITHOUT SPECIFIC PLAN (PAST 1 MONTH): NO

## 2025-06-30 SDOH — HEALTH STABILITY: MENTAL HEALTH: FOR HIGH RISK PATIENTS: ALL INTERVENTIONS ABOVE, PLUS:;1:1 PATIENT OBSERVER AT ALL TIMES

## 2025-06-30 SDOH — HEALTH STABILITY: MENTAL HEALTH: BEHAVIORS/MOOD: AGITATED;UNCOOPERATIVE

## 2025-06-30 SDOH — HEALTH STABILITY: MENTAL HEALTH: IN THE PAST WEEK, HAVE YOU BEEN HAVING THOUGHTS ABOUT KILLING YOURSELF?: YES

## 2025-06-30 SDOH — HEALTH STABILITY: MENTAL HEALTH: HAVE YOU EVER DONE ANYTHING, STARTED TO DO ANYTHING, OR PREPARED TO DO ANYTHING TO END YOUR LIFE?: NO

## 2025-06-30 SDOH — HEALTH STABILITY: MENTAL HEALTH: NON-SPECIFIC ACTIVE SUICIDAL THOUGHTS (PAST 1 MONTH): YES

## 2025-06-30 SDOH — SOCIAL STABILITY: SOCIAL NETWORK: VISITOR BEHAVIORS: UNABLE TO ASSESS

## 2025-06-30 SDOH — HEALTH STABILITY: MENTAL HEALTH: SUICIDAL BEHAVIOR (DESCRIPTION): OVERDOSE IN 03/2024.

## 2025-06-30 SDOH — HEALTH STABILITY: MENTAL HEALTH: DELUSIONS: CONTROLLED

## 2025-06-30 SDOH — HEALTH STABILITY: MENTAL HEALTH: DEPRESSION SYMPTOMS: INCREASED IRRITABILITY

## 2025-06-30 SDOH — HEALTH STABILITY: MENTAL HEALTH
OTHER SUICIDE PRECAUTIONS INCLUDE: PATIENT PLACED IN AN EASILY OBSERVABLE ROOM WITH DOOR/CURTAIN REMAINING OPEN;PATIENT PLACED IN GOWN (SNAPS OR PAPER GOWNS PREFERRED) AND WANDED;REMAINING RISKS IDENTIFIED AND MITIGATED;PROVIDER NOTIFIED;ELOPEMENT RISK IDENTIFIED;FREQUENT ROUNDING WITH IRREGULAR CHECKS AT MINIMUM OF EVERY 15 MINUTES TO ASSESS PSYCH SAFETY PERFORMED;HOME MEDICATION LIST COLLECTED AND SHARED WITH PROVIDER;HOURLY BEHAVIORAL ASSESSMENT PERFORMED;PERSONAL BELONGINGS SECURED;TREATMENT PLAN BASED ON RISK FACTORS DEVELOPED (ED ONLY - IF PATIENT IN ED MORE THAN 8 HOURS);VISITORS LIMITED WHEN NECESSARY AND PERSONAL ITEMS SCREENED

## 2025-06-30 SDOH — HEALTH STABILITY: MENTAL HEALTH: NEEDS EXPRESSED: DENIES

## 2025-06-30 SDOH — ECONOMIC STABILITY: GENERAL

## 2025-06-30 SDOH — HEALTH STABILITY: MENTAL HEALTH: BEHAVIORS/MOOD: RESTLESS

## 2025-06-30 SDOH — ECONOMIC STABILITY: HOUSING INSECURITY: FEELS SAFE LIVING IN HOME: YES

## 2025-06-30 SDOH — HEALTH STABILITY: MENTAL HEALTH: BEHAVIORS/MOOD: IRRITABLE;UNCOOPERATIVE

## 2025-06-30 SDOH — HEALTH STABILITY: MENTAL HEALTH: ANXIETY SYMPTOMS: NO PROBLEMS REPORTED OR OBSERVED.

## 2025-06-30 SDOH — HEALTH STABILITY: MENTAL HEALTH: CONTENT: UNREMARKABLE

## 2025-06-30 SDOH — HEALTH STABILITY: MENTAL HEALTH: BEHAVIORS/MOOD: AGITATED;AGGRESSIVE PHYSICALLY, OTHERS;COMBATIVE

## 2025-06-30 SDOH — HEALTH STABILITY: MENTAL HEALTH: WHEN DID YOU TRY TO KILL YOURSELF?: 03/24

## 2025-06-30 SDOH — HEALTH STABILITY: MENTAL HEALTH: SLEEP PATTERN: NAPS DURING THE DAY

## 2025-06-30 ASSESSMENT — LIFESTYLE VARIABLES
PRESCIPTION_ABUSE_PAST_12_MONTHS: NO
SUBSTANCE_ABUSE_PAST_12_MONTHS: NO

## 2025-06-30 ASSESSMENT — PAIN - FUNCTIONAL ASSESSMENT: PAIN_FUNCTIONAL_ASSESSMENT: 0-10

## 2025-06-30 ASSESSMENT — PAIN SCALES - GENERAL: PAINLEVEL_OUTOF10: 0 - NO PAIN
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
## 2025-07-01 VITALS
TEMPERATURE: 97.2 F | DIASTOLIC BLOOD PRESSURE: 80 MMHG | HEART RATE: 82 BPM | SYSTOLIC BLOOD PRESSURE: 147 MMHG | HEIGHT: 66 IN | RESPIRATION RATE: 16 BRPM | WEIGHT: 155 LBS | OXYGEN SATURATION: 95 % | BODY MASS INDEX: 24.91 KG/M2

## 2025-07-01 LAB
ATRIAL RATE: 73 BPM
P AXIS: 69 DEGREES
P OFFSET: 184 MS
P ONSET: 125 MS
PR INTERVAL: 192 MS
Q ONSET: 221 MS
QRS COUNT: 12 BEATS
QRS DURATION: 80 MS
QT INTERVAL: 398 MS
QTC CALCULATION(BAZETT): 438 MS
QTC FREDERICIA: 425 MS
R AXIS: 77 DEGREES
T AXIS: 70 DEGREES
T OFFSET: 420 MS
VENTRICULAR RATE: 73 BPM

## 2025-07-01 PROCEDURE — 2500000001 HC RX 250 WO HCPCS SELF ADMINISTERED DRUGS (ALT 637 FOR MEDICARE OP): Performed by: EMERGENCY MEDICINE

## 2025-07-01 PROCEDURE — 2500000002 HC RX 250 W HCPCS SELF ADMINISTERED DRUGS (ALT 637 FOR MEDICARE OP, ALT 636 FOR OP/ED)

## 2025-07-01 PROCEDURE — 2500000001 HC RX 250 WO HCPCS SELF ADMINISTERED DRUGS (ALT 637 FOR MEDICARE OP)

## 2025-07-01 RX ADMIN — AMLODIPINE BESYLATE 10 MG: 10 TABLET ORAL at 10:30

## 2025-07-01 RX ADMIN — PANTOPRAZOLE SODIUM 40 MG: 40 TABLET, DELAYED RELEASE ORAL at 10:30

## 2025-07-01 RX ADMIN — ASPIRIN 81 MG: 81 TABLET, COATED ORAL at 10:30

## 2025-07-01 RX ADMIN — CARBIDOPA AND LEVODOPA 1 TABLET: 25; 100 TABLET ORAL at 10:30

## 2025-07-01 RX ADMIN — DIVALPROEX SODIUM 500 MG: 500 TABLET, DELAYED RELEASE ORAL at 10:30

## 2025-07-01 RX ADMIN — BUPROPION HYDROCHLORIDE 150 MG: 150 TABLET, EXTENDED RELEASE ORAL at 10:31

## 2025-07-01 RX ADMIN — CELECOXIB 100 MG: 100 CAPSULE ORAL at 10:30

## 2025-07-01 RX ADMIN — ATORVASTATIN CALCIUM 80 MG: 80 TABLET, FILM COATED ORAL at 10:30

## 2025-07-01 RX ADMIN — GABAPENTIN 100 MG: 100 CAPSULE ORAL at 10:30

## 2025-07-01 RX ADMIN — LEVOTHYROXINE SODIUM 50 MCG: 0.05 TABLET ORAL at 10:30

## 2025-07-01 RX ADMIN — CLOPIDOGREL 75 MG: 75 TABLET ORAL at 10:30

## 2025-07-01 RX ADMIN — LAMOTRIGINE 25 MG: 25 TABLET ORAL at 10:30

## 2025-07-01 SDOH — HEALTH STABILITY: MENTAL HEALTH: SLEEP PATTERN: NAPS DURING THE DAY

## 2025-07-01 SDOH — HEALTH STABILITY: MENTAL HEALTH: HAVE YOU ACTUALLY HAD ANY THOUGHTS OF KILLING YOURSELF?: YES

## 2025-07-01 SDOH — HEALTH STABILITY: MENTAL HEALTH: IN THE PAST FEW WEEKS, HAVE YOU FELT THAT YOU OR YOUR FAMILY WOULD BE BETTER OFF IF YOU WERE DEAD?: NO

## 2025-07-01 SDOH — SOCIAL STABILITY: SOCIAL NETWORK: VISITOR BEHAVIORS: UNABLE TO ASSESS

## 2025-07-01 SDOH — HEALTH STABILITY: MENTAL HEALTH: NEEDS EXPRESSED: DENIES

## 2025-07-01 SDOH — HEALTH STABILITY: MENTAL HEALTH: FOR HIGH RISK PATIENTS: ALL INTERVENTIONS ABOVE, PLUS:;1:1 PATIENT OBSERVER AT ALL TIMES

## 2025-07-01 SDOH — HEALTH STABILITY: MENTAL HEALTH: BEHAVIORAL HEALTH(WDL): EXCEPTIONS TO WDL

## 2025-07-01 SDOH — HEALTH STABILITY: MENTAL HEALTH: IN THE PAST FEW WEEKS, HAVE YOU WISHED YOU WERE DEAD?: NO

## 2025-07-01 SDOH — HEALTH STABILITY: MENTAL HEALTH: DELUSIONS: OTHER (COMMENT)

## 2025-07-01 SDOH — HEALTH STABILITY: MENTAL HEALTH

## 2025-07-01 SDOH — HEALTH STABILITY: MENTAL HEALTH: CONTENT: UNREMARKABLE

## 2025-07-01 SDOH — HEALTH STABILITY: MENTAL HEALTH
OTHER SUICIDE PRECAUTIONS INCLUDE: PATIENT PLACED IN AN EASILY OBSERVABLE ROOM WITH DOOR/CURTAIN REMAINING OPEN;PATIENT PLACED IN GOWN (SNAPS OR PAPER GOWNS PREFERRED) AND WANDED;REMAINING RISKS IDENTIFIED AND MITIGATED;PATIENT PLACED IN PSYCH SAFE ROOM (IF AVAILABLE);PROVIDER NOTIFIED;ELOPEMENT RISK IDENTIFIED;FAMILY/VISITOR ADVISED TO MAINTAIN CONTROL OF OWN PERSONAL BELONGINGS IN ROOM;FREQUENT ROUNDING WITH IRREGULAR CHECKS AT MINIMUM OF EVERY 15 MINUTES TO ASSESS PSYCH SAFETY PERFORMED;HOME MEDICATION LIST COLLECTED AND SHARED WITH PROVIDER;HOURLY BEHAVIORAL ASSESSMENT PERFORMED;PERSONAL BELONGINGS SECURED;TREATMENT PLAN BASED ON RISK FACTORS DEVELOPED (ED ONLY - IF PATIENT IN ED MORE THAN 8 HOURS);VISITORS LIMITED WHEN NECESSARY AND PERSONAL ITEMS SCREENED

## 2025-07-01 SDOH — HEALTH STABILITY: MENTAL HEALTH: ARE YOU HAVING THOUGHTS OF KILLING YOURSELF RIGHT NOW?: NO

## 2025-07-01 SDOH — HEALTH STABILITY: MENTAL HEALTH: HAVE YOU WISHED YOU WERE DEAD OR WISHED YOU COULD GO TO SLEEP AND NOT WAKE UP?: NO

## 2025-07-01 SDOH — HEALTH STABILITY: MENTAL HEALTH: IN THE PAST WEEK, HAVE YOU BEEN HAVING THOUGHTS ABOUT KILLING YOURSELF?: YES

## 2025-07-01 SDOH — SOCIAL STABILITY: SOCIAL NETWORK: PARENT/GUARDIAN/SIGNIFICANT OTHER INVOLVEMENT: NO INVOLVEMENT

## 2025-07-01 SDOH — HEALTH STABILITY: MENTAL HEALTH: HAVE YOU HAD THESE THOUGHTS AND HAD SOME INTENTION OF ACTING ON THEM?: YES

## 2025-07-01 SDOH — HEALTH STABILITY: MENTAL HEALTH
HAVE YOU STARTED TO WORK OUT OR WORKED OUT THE DETAILS OF HOW TO KILL YOURSELF? DO YOU INTENT TO CARRY OUT THIS PLAN?: YES

## 2025-07-01 SDOH — SOCIAL STABILITY: SOCIAL NETWORK: EMOTIONAL SUPPORT GIVEN: REASSURE

## 2025-07-01 SDOH — HEALTH STABILITY: MENTAL HEALTH: HAVE YOU BEEN THINKING ABOUT HOW YOU MIGHT DO THIS?: YES

## 2025-07-01 SDOH — SOCIAL STABILITY: SOCIAL INSECURITY: FAMILY BEHAVIORS: UNABLE TO ASSESS

## 2025-07-01 SDOH — HEALTH STABILITY: MENTAL HEALTH: BEHAVIORS/MOOD: CALM

## 2025-07-01 SDOH — HEALTH STABILITY: MENTAL HEALTH: SUICIDE ASSESSMENT: ADULT (C-SSRS)

## 2025-07-01 SDOH — HEALTH STABILITY: MENTAL HEALTH: HAVE YOU EVER TRIED TO KILL YOURSELF?: YES

## 2025-07-01 SDOH — HEALTH STABILITY: MENTAL HEALTH: HAVE YOU EVER DONE ANYTHING, STARTED TO DO ANYTHING, OR PREPARED TO DO ANYTHING TO END YOUR LIFE?: NO

## 2025-07-01 ASSESSMENT — PAIN - FUNCTIONAL ASSESSMENT: PAIN_FUNCTIONAL_ASSESSMENT: 0-10

## 2025-07-01 ASSESSMENT — PAIN SCALES - GENERAL: PAINLEVEL_OUTOF10: 0 - NO PAIN

## 2025-07-01 NOTE — ED NOTES
Transport to [facility] in [place] booked through [facility] @ [id number]     Viki Bejarano, PCNA  06/30/25 2842
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
## 2025-07-01 NOTE — ED NOTES
[facility] arrived at this time to transport patient. Given report, paperwork and patient belongings. Patient tolerated po routine medications prior to transfer without complications, swallowed pills whole with sips of thin liquids. Patient IV removed and vitals documented.      Lucita Falcon RN  07/01/25 [id number]

## 2025-07-01 NOTE — ED NOTES
[id number] Neha from [facility] called and asked when the patient will be picked and to make sure patient is going to the [place] location. She also asked for an updated MAR to be faxed at  faxed paper work successfully.     Yesenia Musa, EMT  07/01/25 [id number]

## 2025-07-02 LAB
ATRIAL RATE: 69 BPM
P AXIS: 57 DEGREES
P OFFSET: 183 MS
P ONSET: 122 MS
PR INTERVAL: 196 MS
Q ONSET: 220 MS
QRS COUNT: 11 BEATS
QRS DURATION: 82 MS
QT INTERVAL: 404 MS
QTC CALCULATION(BAZETT): 432 MS
QTC FREDERICIA: 423 MS
R AXIS: 73 DEGREES
T AXIS: 48 DEGREES
T OFFSET: 422 MS
VENTRICULAR RATE: 69 BPM

## 2025-07-06 ENCOUNTER — LAB REQUISITION (OUTPATIENT)
Dept: LAB | Facility: HOSPITAL | Age: 75
End: 2025-07-06
Payer: COMMERCIAL

## 2025-07-06 DIAGNOSIS — F51.01 PRIMARY INSOMNIA: ICD-10-CM

## 2025-07-06 DIAGNOSIS — Z86.73 PERSONAL HISTORY OF TRANSIENT ISCHEMIC ATTACK (TIA), AND CEREBRAL INFARCTION WITHOUT RESIDUAL DEFICITS: ICD-10-CM

## 2025-07-06 DIAGNOSIS — I10 ESSENTIAL (PRIMARY) HYPERTENSION: ICD-10-CM

## 2025-07-06 DIAGNOSIS — M19.90 UNSPECIFIED OSTEOARTHRITIS, UNSPECIFIED SITE: ICD-10-CM

## 2025-07-06 DIAGNOSIS — F03.C18 UNSPECIFIED DEMENTIA, SEVERE, WITH OTHER BEHAVIORAL DISTURBANCE: ICD-10-CM

## 2025-07-06 DIAGNOSIS — G30.1 ALZHEIMER'S DISEASE WITH LATE ONSET (CODE): ICD-10-CM

## 2025-07-06 DIAGNOSIS — S40.811A ABRASION OF RIGHT UPPER ARM, INITIAL ENCOUNTER: ICD-10-CM

## 2025-07-06 DIAGNOSIS — F31.5 BIPOLAR DISORDER, CURRENT EPISODE DEPRESSED, SEVERE, WITH PSYCHOTIC FEATURES (MULTI): ICD-10-CM

## 2025-07-06 DIAGNOSIS — K59.00 CONSTIPATION, UNSPECIFIED: ICD-10-CM

## 2025-07-06 DIAGNOSIS — E78.5 HYPERLIPIDEMIA, UNSPECIFIED: ICD-10-CM

## 2025-07-06 DIAGNOSIS — F41.1 GENERALIZED ANXIETY DISORDER: ICD-10-CM

## 2025-07-06 LAB
ACANTHOCYTES BLD QL SMEAR: NORMAL
ALBUMIN SERPL BCP-MCNC: 3.8 G/DL (ref 3.4–5)
ALP SERPL-CCNC: 96 U/L (ref 33–136)
ALT SERPL W P-5'-P-CCNC: 19 U/L (ref 10–52)
ANION GAP SERPL CALC-SCNC: 15 MMOL/L (ref 10–20)
AST SERPL W P-5'-P-CCNC: 43 U/L (ref 9–39)
BASOPHILS # BLD AUTO: 0.02 X10*3/UL (ref 0–0.1)
BASOPHILS NFR BLD AUTO: 0.1 %
BILIRUB SERPL-MCNC: 1.4 MG/DL (ref 0–1.2)
BUN SERPL-MCNC: 12 MG/DL (ref 6–23)
BURR CELLS BLD QL SMEAR: NORMAL
CALCIUM SERPL-MCNC: 8.7 MG/DL (ref 8.6–10.3)
CHLORIDE SERPL-SCNC: 101 MMOL/L (ref 98–107)
CO2 SERPL-SCNC: 24 MMOL/L (ref 21–32)
CREAT SERPL-MCNC: 0.75 MG/DL (ref 0.5–1.3)
EGFRCR SERPLBLD CKD-EPI 2021: >90 ML/MIN/1.73M*2
EOSINOPHIL # BLD AUTO: 0.1 X10*3/UL (ref 0–0.4)
EOSINOPHIL NFR BLD AUTO: 0.7 %
ERYTHROCYTE [DISTWIDTH] IN BLOOD BY AUTOMATED COUNT: 20.6 % (ref 11.5–14.5)
GLUCOSE SERPL-MCNC: 93 MG/DL (ref 74–99)
HCT VFR BLD AUTO: 39 % (ref 41–52)
HGB BLD-MCNC: 12.3 G/DL (ref 13.5–17.5)
HOWELL-JOLLY BOD BLD QL SMEAR: PRESENT
IMM GRANULOCYTES # BLD AUTO: 0.16 X10*3/UL (ref 0–0.5)
IMM GRANULOCYTES NFR BLD AUTO: 1.1 % (ref 0–0.9)
LYMPHOCYTES # BLD AUTO: 1.1 X10*3/UL (ref 0.8–3)
LYMPHOCYTES NFR BLD AUTO: 7.4 %
MCH RBC QN AUTO: 29.1 PG (ref 26–34)
MCHC RBC AUTO-ENTMCNC: 31.5 G/DL (ref 32–36)
MCV RBC AUTO: 92 FL (ref 80–100)
MONOCYTES # BLD AUTO: 1.1 X10*3/UL (ref 0.05–0.8)
MONOCYTES NFR BLD AUTO: 7.4 %
NEUTROPHILS # BLD AUTO: 12.35 X10*3/UL (ref 1.6–5.5)
NEUTROPHILS NFR BLD AUTO: 83.3 %
NRBC BLD-RTO: 0.1 /100 WBCS (ref 0–0)
PLATELET # BLD AUTO: 254 X10*3/UL (ref 150–450)
POTASSIUM SERPL-SCNC: 4.3 MMOL/L (ref 3.5–5.3)
PROT SERPL-MCNC: 6.3 G/DL (ref 6.4–8.2)
RBC # BLD AUTO: 4.23 X10*6/UL (ref 4.5–5.9)
RBC MORPH BLD: NORMAL
SODIUM SERPL-SCNC: 136 MMOL/L (ref 136–145)
WBC # BLD AUTO: 14.8 X10*3/UL (ref 4.4–11.3)

## 2025-07-06 PROCEDURE — 85025 COMPLETE CBC W/AUTO DIFF WBC: CPT

## 2025-07-06 PROCEDURE — 80053 COMPREHEN METABOLIC PANEL: CPT

## 2025-09-05 LAB
ATRIAL RATE: 300 BPM
ATRIAL RATE: 70 BPM
P AXIS: 102 DEGREES
P AXIS: 78 DEGREES
P OFFSET: 152 MS
P OFFSET: 156 MS
P ONSET: 118 MS
P ONSET: 136 MS
PR INTERVAL: 216 MS
Q ONSET: 217 MS
Q ONSET: 226 MS
QRS COUNT: 12 BEATS
QRS COUNT: 12 BEATS
QRS DURATION: 68 MS
QRS DURATION: 86 MS
QT INTERVAL: 376 MS
QT INTERVAL: 400 MS
QTC CALCULATION(BAZETT): 406 MS
QTC CALCULATION(BAZETT): 438 MS
QTC FREDERICIA: 396 MS
QTC FREDERICIA: 425 MS
R AXIS: 60 DEGREES
R AXIS: 66 DEGREES
T AXIS: 44 DEGREES
T AXIS: 72 DEGREES
T OFFSET: 414 MS
T OFFSET: 417 MS
VENTRICULAR RATE: 70 BPM
VENTRICULAR RATE: 72 BPM